# Patient Record
Sex: MALE | Race: WHITE | Employment: FULL TIME | URBAN - NONMETROPOLITAN AREA
[De-identification: names, ages, dates, MRNs, and addresses within clinical notes are randomized per-mention and may not be internally consistent; named-entity substitution may affect disease eponyms.]

---

## 2017-01-03 ENCOUNTER — TELEPHONE (OUTPATIENT)
Dept: FAMILY MEDICINE CLINIC | Age: 52
End: 2017-01-03

## 2017-01-03 DIAGNOSIS — Z00.00 GENERAL MEDICAL EXAM: Primary | ICD-10-CM

## 2017-01-03 DIAGNOSIS — Z12.5 SCREENING PSA (PROSTATE SPECIFIC ANTIGEN): ICD-10-CM

## 2017-01-21 DIAGNOSIS — Z00.00 GENERAL MEDICAL EXAM: ICD-10-CM

## 2017-01-21 DIAGNOSIS — Z12.5 SCREENING PSA (PROSTATE SPECIFIC ANTIGEN): ICD-10-CM

## 2017-01-21 LAB
ABSOLUTE EOS #: 0.2 K/UL (ref 0–0.4)
ABSOLUTE LYMPH #: 1.3 K/UL (ref 1–4.8)
ABSOLUTE MONO #: 0.4 K/UL (ref 0.1–1.2)
ANION GAP SERPL CALCULATED.3IONS-SCNC: 12 MMOL/L (ref 9–17)
BASOPHILS # BLD: 1 % (ref 0–2)
BASOPHILS ABSOLUTE: 0.1 K/UL (ref 0–0.2)
BUN BLDV-MCNC: 21 MG/DL (ref 6–20)
BUN/CREAT BLD: 20 (ref 9–20)
CALCIUM SERPL-MCNC: 10.2 MG/DL (ref 8.6–10.4)
CHLORIDE BLD-SCNC: 107 MMOL/L (ref 98–107)
CHOLESTEROL/HDL RATIO: 2.9
CHOLESTEROL: 132 MG/DL
CO2: 27 MMOL/L (ref 20–31)
CREAT SERPL-MCNC: 1.05 MG/DL (ref 0.7–1.2)
DIFFERENTIAL TYPE: ABNORMAL
EOSINOPHILS RELATIVE PERCENT: 4 % (ref 1–4)
GFR AFRICAN AMERICAN: >60 ML/MIN
GFR NON-AFRICAN AMERICAN: >60 ML/MIN
GFR SERPL CREATININE-BSD FRML MDRD: ABNORMAL ML/MIN/{1.73_M2}
GFR SERPL CREATININE-BSD FRML MDRD: ABNORMAL ML/MIN/{1.73_M2}
GLUCOSE BLD-MCNC: 103 MG/DL (ref 70–99)
HCT VFR BLD CALC: 41.5 % (ref 41–53)
HDLC SERPL-MCNC: 46 MG/DL
HEMOGLOBIN: 13.7 G/DL (ref 13.5–17.5)
LDL CHOLESTEROL: 75 MG/DL (ref 0–130)
LYMPHOCYTES # BLD: 27 % (ref 24–44)
MCH RBC QN AUTO: 30.3 PG (ref 26–34)
MCHC RBC AUTO-ENTMCNC: 33.1 G/DL (ref 31–37)
MCV RBC AUTO: 91.6 FL (ref 80–100)
MONOCYTES # BLD: 8 % (ref 1–7)
PDW BLD-RTO: 13 % (ref 11–14.5)
PLATELET # BLD: 279 K/UL (ref 140–450)
PLATELET ESTIMATE: ABNORMAL
PMV BLD AUTO: 7.9 FL (ref 6–12)
POTASSIUM SERPL-SCNC: 3.9 MMOL/L (ref 3.7–5.3)
PROSTATE SPECIFIC ANTIGEN: 0.93 UG/L
RBC # BLD: 4.53 M/UL (ref 4.5–5.9)
RBC # BLD: ABNORMAL 10*6/UL
SEG NEUTROPHILS: 60 % (ref 36–66)
SEGMENTED NEUTROPHILS ABSOLUTE COUNT: 2.9 K/UL (ref 1.8–7.7)
SODIUM BLD-SCNC: 146 MMOL/L (ref 135–144)
TRIGL SERPL-MCNC: 57 MG/DL
VLDLC SERPL CALC-MCNC: 11 MG/DL (ref 1–30)
WBC # BLD: 4.8 K/UL (ref 3.5–11)
WBC # BLD: ABNORMAL 10*3/UL

## 2017-01-21 PROCEDURE — G0103 PSA SCREENING: HCPCS | Performed by: FAMILY MEDICINE

## 2017-01-21 PROCEDURE — 80061 LIPID PANEL: CPT | Performed by: FAMILY MEDICINE

## 2017-01-21 PROCEDURE — 36415 COLL VENOUS BLD VENIPUNCTURE: CPT | Performed by: FAMILY MEDICINE

## 2017-01-21 PROCEDURE — 85025 COMPLETE CBC W/AUTO DIFF WBC: CPT | Performed by: FAMILY MEDICINE

## 2017-01-21 PROCEDURE — 80048 BASIC METABOLIC PNL TOTAL CA: CPT | Performed by: FAMILY MEDICINE

## 2017-02-08 ENCOUNTER — OFFICE VISIT (OUTPATIENT)
Dept: FAMILY MEDICINE CLINIC | Age: 52
End: 2017-02-08

## 2017-02-08 VITALS
DIASTOLIC BLOOD PRESSURE: 80 MMHG | SYSTOLIC BLOOD PRESSURE: 150 MMHG | HEIGHT: 70 IN | HEART RATE: 72 BPM | WEIGHT: 199 LBS | BODY MASS INDEX: 28.49 KG/M2

## 2017-02-08 DIAGNOSIS — Z00.00 ENCOUNTER FOR PREVENTATIVE ADULT HEALTH CARE EXAMINATION: Primary | ICD-10-CM

## 2017-02-08 DIAGNOSIS — Z12.11 SCREENING FOR COLON CANCER: ICD-10-CM

## 2017-02-08 DIAGNOSIS — F31.78 BIPOLAR DISORDER, IN FULL REMISSION, MOST RECENT EPISODE MIXED (HCC): ICD-10-CM

## 2017-02-08 DIAGNOSIS — G47.33 OSA ON CPAP: ICD-10-CM

## 2017-02-08 DIAGNOSIS — Z99.89 OSA ON CPAP: ICD-10-CM

## 2017-02-08 DIAGNOSIS — Z12.5 SCREENING PSA (PROSTATE SPECIFIC ANTIGEN): ICD-10-CM

## 2017-02-08 PROCEDURE — 99396 PREV VISIT EST AGE 40-64: CPT | Performed by: FAMILY MEDICINE

## 2017-02-08 ASSESSMENT — ENCOUNTER SYMPTOMS
RESPIRATORY NEGATIVE: 1
BACK PAIN: 1
GASTROINTESTINAL NEGATIVE: 1
ALLERGIC/IMMUNOLOGIC NEGATIVE: 1
EYES NEGATIVE: 1

## 2017-03-31 ENCOUNTER — OFFICE VISIT (OUTPATIENT)
Dept: FAMILY MEDICINE CLINIC | Age: 52
End: 2017-03-31
Payer: COMMERCIAL

## 2017-03-31 VITALS
SYSTOLIC BLOOD PRESSURE: 146 MMHG | HEART RATE: 80 BPM | WEIGHT: 199.08 LBS | BODY MASS INDEX: 28.5 KG/M2 | DIASTOLIC BLOOD PRESSURE: 80 MMHG | HEIGHT: 70 IN

## 2017-03-31 DIAGNOSIS — L72.12 TRICHILEMMAL CYST: Primary | ICD-10-CM

## 2017-03-31 PROCEDURE — 11422 EXC H-F-NK-SP B9+MARG 1.1-2: CPT | Performed by: FAMILY MEDICINE

## 2017-03-31 PROCEDURE — 99213 OFFICE O/P EST LOW 20 MIN: CPT | Performed by: FAMILY MEDICINE

## 2017-03-31 ASSESSMENT — ENCOUNTER SYMPTOMS
RESPIRATORY NEGATIVE: 1
GASTROINTESTINAL NEGATIVE: 1
EYES NEGATIVE: 1
ALLERGIC/IMMUNOLOGIC NEGATIVE: 1

## 2017-04-05 ENCOUNTER — OFFICE VISIT (OUTPATIENT)
Dept: FAMILY MEDICINE CLINIC | Age: 52
End: 2017-04-05

## 2017-04-05 DIAGNOSIS — Z48.02 VISIT FOR SUTURE REMOVAL: Primary | ICD-10-CM

## 2017-04-05 DIAGNOSIS — L84 CORN: Primary | ICD-10-CM

## 2017-04-05 PROCEDURE — 99024 POSTOP FOLLOW-UP VISIT: CPT | Performed by: FAMILY MEDICINE

## 2017-04-12 ENCOUNTER — OFFICE VISIT (OUTPATIENT)
Dept: PODIATRY | Age: 52
End: 2017-04-12
Payer: COMMERCIAL

## 2017-04-12 VITALS
SYSTOLIC BLOOD PRESSURE: 130 MMHG | BODY MASS INDEX: 28.52 KG/M2 | HEIGHT: 70 IN | DIASTOLIC BLOOD PRESSURE: 80 MMHG | WEIGHT: 199.2 LBS | HEART RATE: 80 BPM

## 2017-04-12 DIAGNOSIS — M79.672 LEFT FOOT PAIN: ICD-10-CM

## 2017-04-12 DIAGNOSIS — L84 CORNS AND CALLOSITIES: Primary | ICD-10-CM

## 2017-04-12 PROCEDURE — 11055 PARING/CUTG B9 HYPRKER LES 1: CPT | Performed by: PODIATRIST

## 2017-04-12 PROCEDURE — L3040 FT ARCH SUPRT PREMOLD LONGIT: HCPCS | Performed by: PODIATRIST

## 2017-05-03 ENCOUNTER — NURSE ONLY (OUTPATIENT)
Dept: SURGERY | Age: 52
End: 2017-05-03

## 2017-05-03 VITALS
WEIGHT: 208 LBS | DIASTOLIC BLOOD PRESSURE: 80 MMHG | HEART RATE: 80 BPM | HEIGHT: 70 IN | BODY MASS INDEX: 29.78 KG/M2 | SYSTOLIC BLOOD PRESSURE: 140 MMHG

## 2017-05-03 DIAGNOSIS — Z12.11 COLON CANCER SCREENING: Primary | ICD-10-CM

## 2017-05-04 RX ORDER — POLYETHYLENE GLYCOL 3350, SODIUM CHLORIDE, SODIUM BICARBONATE, POTASSIUM CHLORIDE 420; 11.2; 5.72; 1.48 G/4L; G/4L; G/4L; G/4L
POWDER, FOR SOLUTION ORAL
Qty: 4000 ML | Refills: 0 | Status: SHIPPED | OUTPATIENT
Start: 2017-05-04 | End: 2018-04-04 | Stop reason: ALTCHOICE

## 2017-07-14 ENCOUNTER — TELEPHONE (OUTPATIENT)
Dept: SURGERY | Age: 52
End: 2017-07-14

## 2018-01-24 ENCOUNTER — TELEPHONE (OUTPATIENT)
Dept: FAMILY MEDICINE CLINIC | Age: 53
End: 2018-01-24

## 2018-01-24 DIAGNOSIS — Z13.9 ENCOUNTER FOR SCREENING: Primary | ICD-10-CM

## 2018-01-24 NOTE — TELEPHONE ENCOUNTER
Pre service calling stating they scheduled pt for an appt 4-4 and they want labs put in for pt to do prior.

## 2018-03-24 ENCOUNTER — HOSPITAL ENCOUNTER (OUTPATIENT)
Dept: LAB | Age: 53
Setting detail: SPECIMEN
Discharge: HOME | End: 2018-03-24
Payer: COMMERCIAL

## 2018-03-24 DIAGNOSIS — Z13.9 ENCOUNTER FOR SCREENING: ICD-10-CM

## 2018-03-24 LAB
ABSOLUTE EOS #: 0.1 K/UL (ref 0–0.4)
ABSOLUTE IMMATURE GRANULOCYTE: NORMAL K/UL (ref 0–0.3)
ABSOLUTE LYMPH #: 1.7 K/UL (ref 1–4.8)
ABSOLUTE MONO #: 0.4 K/UL (ref 0.1–1.2)
ANION GAP SERPL CALCULATED.3IONS-SCNC: 10 MMOL/L (ref 9–17)
BASOPHILS # BLD: 1 % (ref 0–2)
BASOPHILS ABSOLUTE: 0.1 K/UL (ref 0–0.2)
BUN BLDV-MCNC: 18 MG/DL (ref 6–20)
BUN/CREAT BLD: 18 (ref 9–20)
CALCIUM SERPL-MCNC: 10.1 MG/DL (ref 8.6–10.4)
CHLORIDE BLD-SCNC: 106 MMOL/L (ref 98–107)
CHOLESTEROL, FASTING: 171 MG/DL
CHOLESTEROL/HDL RATIO: 3.1
CO2: 29 MMOL/L (ref 20–31)
CREAT SERPL-MCNC: 1 MG/DL (ref 0.7–1.2)
DIFFERENTIAL TYPE: NORMAL
EOSINOPHILS RELATIVE PERCENT: 3 % (ref 1–8)
GFR AFRICAN AMERICAN: >60 ML/MIN
GFR NON-AFRICAN AMERICAN: >60 ML/MIN
GFR SERPL CREATININE-BSD FRML MDRD: ABNORMAL ML/MIN/{1.73_M2}
GFR SERPL CREATININE-BSD FRML MDRD: ABNORMAL ML/MIN/{1.73_M2}
GLUCOSE FASTING: 106 MG/DL (ref 70–99)
HCT VFR BLD CALC: 42.7 % (ref 41–53)
HDLC SERPL-MCNC: 56 MG/DL
HEMOGLOBIN: 14.4 G/DL (ref 13.5–17.5)
IMMATURE GRANULOCYTES: NORMAL %
LDL CHOLESTEROL: 104 MG/DL (ref 0–130)
LYMPHOCYTES # BLD: 33 % (ref 15–43)
MCH RBC QN AUTO: 30.6 PG (ref 26–34)
MCHC RBC AUTO-ENTMCNC: 33.6 G/DL (ref 31–37)
MCV RBC AUTO: 91 FL (ref 80–100)
MONOCYTES # BLD: 7 % (ref 6–14)
NRBC AUTOMATED: NORMAL PER 100 WBC
PDW BLD-RTO: 13.1 % (ref 11–14.5)
PLATELET # BLD: 250 K/UL (ref 140–450)
PLATELET ESTIMATE: NORMAL
PMV BLD AUTO: 7.9 FL (ref 6–12)
POTASSIUM SERPL-SCNC: 4.1 MMOL/L (ref 3.7–5.3)
PROSTATE SPECIFIC ANTIGEN: 1.04 UG/L
RBC # BLD: 4.7 M/UL (ref 4.5–5.9)
RBC # BLD: NORMAL 10*6/UL
SEG NEUTROPHILS: 56 % (ref 44–74)
SEGMENTED NEUTROPHILS ABSOLUTE COUNT: 2.9 K/UL (ref 1.8–7.7)
SODIUM BLD-SCNC: 145 MMOL/L (ref 135–144)
TRIGLYCERIDE, FASTING: 56 MG/DL
VLDLC SERPL CALC-MCNC: NORMAL MG/DL (ref 1–30)
WBC # BLD: 5.2 K/UL (ref 3.5–11)
WBC # BLD: NORMAL 10*3/UL

## 2018-03-24 PROCEDURE — G0103 PSA SCREENING: HCPCS

## 2018-03-24 PROCEDURE — 80048 BASIC METABOLIC PNL TOTAL CA: CPT

## 2018-03-24 PROCEDURE — 85025 COMPLETE CBC W/AUTO DIFF WBC: CPT

## 2018-03-24 PROCEDURE — 36415 COLL VENOUS BLD VENIPUNCTURE: CPT

## 2018-03-24 PROCEDURE — 80061 LIPID PANEL: CPT

## 2018-04-04 ENCOUNTER — OFFICE VISIT (OUTPATIENT)
Dept: FAMILY MEDICINE CLINIC | Age: 53
End: 2018-04-04
Payer: COMMERCIAL

## 2018-04-04 VITALS
WEIGHT: 204.8 LBS | DIASTOLIC BLOOD PRESSURE: 94 MMHG | HEIGHT: 70 IN | RESPIRATION RATE: 12 BRPM | SYSTOLIC BLOOD PRESSURE: 138 MMHG | BODY MASS INDEX: 29.32 KG/M2 | HEART RATE: 60 BPM

## 2018-04-04 DIAGNOSIS — I10 ESSENTIAL HYPERTENSION: ICD-10-CM

## 2018-04-04 DIAGNOSIS — Z99.89 OSA ON CPAP: ICD-10-CM

## 2018-04-04 DIAGNOSIS — G47.33 OSA ON CPAP: ICD-10-CM

## 2018-04-04 DIAGNOSIS — Z00.00 ENCOUNTER FOR PREVENTATIVE ADULT HEALTH CARE EXAMINATION: Primary | ICD-10-CM

## 2018-04-04 DIAGNOSIS — Z12.5 SCREENING PSA (PROSTATE SPECIFIC ANTIGEN): ICD-10-CM

## 2018-04-04 DIAGNOSIS — F31.78 BIPOLAR DISORDER, IN FULL REMISSION, MOST RECENT EPISODE MIXED (HCC): ICD-10-CM

## 2018-04-04 PROCEDURE — 99396 PREV VISIT EST AGE 40-64: CPT | Performed by: FAMILY MEDICINE

## 2018-04-04 RX ORDER — LOSARTAN POTASSIUM 25 MG/1
25 TABLET ORAL DAILY
Qty: 30 TABLET | Refills: 11 | Status: SHIPPED | OUTPATIENT
Start: 2018-04-04 | End: 2018-10-17 | Stop reason: DRUGHIGH

## 2018-04-04 ASSESSMENT — PATIENT HEALTH QUESTIONNAIRE - PHQ9
2. FEELING DOWN, DEPRESSED OR HOPELESS: 0
SUM OF ALL RESPONSES TO PHQ QUESTIONS 1-9: 0
SUM OF ALL RESPONSES TO PHQ9 QUESTIONS 1 & 2: 0
1. LITTLE INTEREST OR PLEASURE IN DOING THINGS: 0

## 2018-04-04 ASSESSMENT — ENCOUNTER SYMPTOMS
EYES NEGATIVE: 1
RESPIRATORY NEGATIVE: 1
BACK PAIN: 1
GASTROINTESTINAL NEGATIVE: 1
ALLERGIC/IMMUNOLOGIC NEGATIVE: 1

## 2018-09-29 ENCOUNTER — HOSPITAL ENCOUNTER (OUTPATIENT)
Dept: LAB | Age: 53
Setting detail: SPECIMEN
Discharge: HOME | End: 2018-09-29
Payer: COMMERCIAL

## 2018-09-29 DIAGNOSIS — G47.33 OSA ON CPAP: ICD-10-CM

## 2018-09-29 DIAGNOSIS — Z99.89 OSA ON CPAP: ICD-10-CM

## 2018-09-29 LAB
ABSOLUTE EOS #: 0.2 K/UL (ref 0–0.4)
ABSOLUTE IMMATURE GRANULOCYTE: NORMAL K/UL (ref 0–0.3)
ABSOLUTE LYMPH #: 1.7 K/UL (ref 1–4.8)
ABSOLUTE MONO #: 0.4 K/UL (ref 0.1–1.2)
ANION GAP SERPL CALCULATED.3IONS-SCNC: 8 MMOL/L (ref 9–17)
BASOPHILS # BLD: 1 % (ref 0–2)
BASOPHILS ABSOLUTE: 0.1 K/UL (ref 0–0.2)
BUN BLDV-MCNC: 18 MG/DL (ref 6–20)
BUN/CREAT BLD: 17 (ref 9–20)
CALCIUM SERPL-MCNC: 10.6 MG/DL (ref 8.6–10.4)
CHLORIDE BLD-SCNC: 106 MMOL/L (ref 98–107)
CO2: 29 MMOL/L (ref 20–31)
CREAT SERPL-MCNC: 1.05 MG/DL (ref 0.7–1.2)
DIFFERENTIAL TYPE: NORMAL
EOSINOPHILS RELATIVE PERCENT: 3 % (ref 1–8)
GFR AFRICAN AMERICAN: >60 ML/MIN
GFR NON-AFRICAN AMERICAN: >60 ML/MIN
GFR SERPL CREATININE-BSD FRML MDRD: ABNORMAL ML/MIN/{1.73_M2}
GFR SERPL CREATININE-BSD FRML MDRD: ABNORMAL ML/MIN/{1.73_M2}
GLUCOSE BLD-MCNC: 106 MG/DL (ref 70–99)
HCT VFR BLD CALC: 43.2 % (ref 41–53)
HEMOGLOBIN: 14.9 G/DL (ref 13.5–17.5)
IMMATURE GRANULOCYTES: NORMAL %
LYMPHOCYTES # BLD: 33 % (ref 15–43)
MCH RBC QN AUTO: 31.7 PG (ref 26–34)
MCHC RBC AUTO-ENTMCNC: 34.5 G/DL (ref 31–37)
MCV RBC AUTO: 92.1 FL (ref 80–100)
MONOCYTES # BLD: 7 % (ref 6–14)
NRBC AUTOMATED: NORMAL PER 100 WBC
PDW BLD-RTO: 12.8 % (ref 11–14.5)
PLATELET # BLD: 293 K/UL (ref 140–450)
PLATELET ESTIMATE: NORMAL
PMV BLD AUTO: 8.1 FL (ref 6–12)
POTASSIUM SERPL-SCNC: 4.5 MMOL/L (ref 3.7–5.3)
RBC # BLD: 4.69 M/UL (ref 4.5–5.9)
RBC # BLD: NORMAL 10*6/UL
SEG NEUTROPHILS: 56 % (ref 44–74)
SEGMENTED NEUTROPHILS ABSOLUTE COUNT: 2.9 K/UL (ref 1.8–7.7)
SODIUM BLD-SCNC: 143 MMOL/L (ref 135–144)
WBC # BLD: 5.3 K/UL (ref 3.5–11)
WBC # BLD: NORMAL 10*3/UL

## 2018-09-29 PROCEDURE — 80048 BASIC METABOLIC PNL TOTAL CA: CPT

## 2018-09-29 PROCEDURE — 36415 COLL VENOUS BLD VENIPUNCTURE: CPT

## 2018-09-29 PROCEDURE — 85025 COMPLETE CBC W/AUTO DIFF WBC: CPT

## 2018-10-17 ENCOUNTER — OFFICE VISIT (OUTPATIENT)
Dept: FAMILY MEDICINE CLINIC | Age: 53
End: 2018-10-17
Payer: COMMERCIAL

## 2018-10-17 VITALS
HEIGHT: 70 IN | WEIGHT: 201 LBS | DIASTOLIC BLOOD PRESSURE: 84 MMHG | BODY MASS INDEX: 28.77 KG/M2 | HEART RATE: 68 BPM | SYSTOLIC BLOOD PRESSURE: 146 MMHG

## 2018-10-17 DIAGNOSIS — Z12.5 SCREENING PSA (PROSTATE SPECIFIC ANTIGEN): ICD-10-CM

## 2018-10-17 DIAGNOSIS — Z99.89 OSA ON CPAP: ICD-10-CM

## 2018-10-17 DIAGNOSIS — G47.33 OSA ON CPAP: ICD-10-CM

## 2018-10-17 DIAGNOSIS — I10 ESSENTIAL HYPERTENSION: Primary | ICD-10-CM

## 2018-10-17 DIAGNOSIS — F31.78 BIPOLAR DISORDER, IN FULL REMISSION, MOST RECENT EPISODE MIXED (HCC): ICD-10-CM

## 2018-10-17 PROCEDURE — 99214 OFFICE O/P EST MOD 30 MIN: CPT | Performed by: FAMILY MEDICINE

## 2018-10-17 PROCEDURE — 90686 IIV4 VACC NO PRSV 0.5 ML IM: CPT | Performed by: FAMILY MEDICINE

## 2018-10-17 PROCEDURE — 90471 IMMUNIZATION ADMIN: CPT | Performed by: FAMILY MEDICINE

## 2018-10-17 RX ORDER — LOSARTAN POTASSIUM 50 MG/1
50 TABLET ORAL DAILY
Qty: 30 TABLET | Refills: 5 | Status: SHIPPED | OUTPATIENT
Start: 2018-10-17 | End: 2018-11-08 | Stop reason: SDUPTHER

## 2018-10-17 ASSESSMENT — ENCOUNTER SYMPTOMS
BACK PAIN: 1
GASTROINTESTINAL NEGATIVE: 1
ALLERGIC/IMMUNOLOGIC NEGATIVE: 1
EYES NEGATIVE: 1
RESPIRATORY NEGATIVE: 1

## 2018-10-17 NOTE — PROGRESS NOTES
Subjective:      Patient ID: Noemi Rausch is a 47 y.o. male. Hypertension        Routine annual physical, updated preventative visit/health maintenance exam, follow up on chronic medical conditions, refills, and review of updated labs. I have reviewed the patient's medical history in detail and updated the computerized patient record. Following with Dr. Ender Galindo for bipolar disorder/meds. bp readings at home a little higher recently at 238'M systolic. Similar here today. Compliant with medications and cpap. Past Medical History:   Diagnosis Date    Bipolar disorder ([facility] [address]     diagnosed 12    DENISE on CPAP 2012    denise      Past Surgical History:   Procedure Laterality Date    COLONOSCOPY  06/16/2017    normal, Dr Venessa Ceron at [facility] yr follow up   1 [facility] N/A 03/31/2017    scalp, done by dr. John Knapp [facility] 2015    [address]  12/15/2000     Current Outpatient Prescriptions   Medication Sig Dispense Refill    Multiple Vitamins-Minerals (MULTIVITAMIN PO) Take 1 tablet by mouth daily      Omega-3 Fatty Acids (FISH OIL PO) Take 2 capsules by mouth daily      Coenzyme Q10 (CO Q 10 PO) Take 1 tablet by mouth daily      losartan (COZAAR) 25 MG tablet Take 1 tablet by mouth daily 30 tablet 11    LITHIUM CARBONATE ER PO Take 1,200 mg by mouth daily.  lamoTRIgine (LAMICTAL) 100 MG tablet Take 100 mg by mouth every morning.  lamoTRIgine (LAMICTAL) 200 MG tablet Take 200 mg by mouth nightly. No current facility-administered medications for this visit.       No Known Allergies  Social History   Substance Use Topics    Smoking status: Never Smoker    Smokeless tobacco: Never Used    Alcohol use Yes      Comment: minimal     Family History   Problem Relation Age of Onset    Uterine Cancer Paternal Grandmother     Lung Cancer Paternal Grandmother     High Cholesterol Paternal Grandmother     Brain Cancer Paternal Grandfather Review of Systems   Constitutional: Negative. HENT: Negative. Eyes: Negative. Respiratory: Negative. Cardiovascular: Negative. Gastrointestinal: Negative. Endocrine: Negative. Genitourinary: Negative. Musculoskeletal: Positive for back pain. Negative for arthralgias. Skin: Negative. Allergic/Immunologic: Negative. Neurological: Negative. Hematological: Negative. Psychiatric/Behavioral: Negative. Objective:   Physical Exam   Constitutional: He is oriented to person, place, and time. He appears well-developed and well-nourished. No distress. HENT:   Head: Normocephalic and atraumatic. Right Ear: External ear normal.   Left Ear: External ear normal.   Mouth/Throat: Oropharynx is clear and moist. No oropharyngeal exudate. Eyes: Conjunctivae and EOM are normal. No scleral icterus. Neck: Neck supple. No thyromegaly present. Cardiovascular: Normal rate, regular rhythm, normal heart sounds and intact distal pulses. No murmur heard. Pulmonary/Chest: Effort normal and breath sounds normal. No respiratory distress. He has no wheezes. Abdominal: Soft. Bowel sounds are normal. He exhibits no distension. There is no tenderness. There is no rebound. Musculoskeletal: Normal range of motion. He exhibits no edema or tenderness. Neurological: He is alert and oriented to person, place, and time. Skin: Skin is warm and dry. No rash noted. No erythema. Psychiatric: He has a normal mood and affect.  His behavior is normal. Judgment and thought content normal.     BP (!) 146/84 (Site: Right Upper Arm, Position: Sitting, Cuff Size: Large Adult)   Pulse 68   Ht 5' 10\" (1.778 m)   Wt 201 lb (91.2 kg)   BMI 28.84 kg/m²   Results for orders placed or performed during the hospital encounter of 72/93/64   Basic Metabolic Panel   Result Value Ref Range    Glucose 106 (H) 70 - 99 mg/dL    BUN 18 6 - 20 mg/dL    CREATININE 1.05 0.70 - 1.20 mg/dL    Bun/Cre Ratio 17 9

## 2018-11-08 RX ORDER — LOSARTAN POTASSIUM 50 MG/1
50 TABLET ORAL DAILY
Qty: 90 TABLET | Refills: 3 | Status: SHIPPED | OUTPATIENT
Start: 2018-11-08 | End: 2019-04-22 | Stop reason: SDUPTHER

## 2018-11-08 NOTE — TELEPHONE ENCOUNTER
From: Cammie Abbott  Sent: 11/7/2018 5:44 PM EST  Subject: Medication Renewal Request    Cammie Abbott would like a refill of the following medications:     losartan (COZAAR) 50 MG tablet Jay Antonio MD]   Patient Comment: [address] does not have this new dosage and will not fill my old prescription.  I will call in the morning to verify or please call me. [phone] Thank You    Preferred pharmacy: [facility] [phone]

## 2019-01-03 ENCOUNTER — OFFICE VISIT (OUTPATIENT)
Dept: INTERNAL MEDICINE | Age: 54
End: 2019-01-03
Payer: COMMERCIAL

## 2019-01-03 VITALS
OXYGEN SATURATION: 98 % | HEART RATE: 86 BPM | BODY MASS INDEX: 30.46 KG/M2 | WEIGHT: 212.8 LBS | RESPIRATION RATE: 18 BRPM | HEIGHT: 70 IN | SYSTOLIC BLOOD PRESSURE: 136 MMHG | TEMPERATURE: 98.4 F | DIASTOLIC BLOOD PRESSURE: 80 MMHG

## 2019-01-03 DIAGNOSIS — J01.00 ACUTE NON-RECURRENT MAXILLARY SINUSITIS: Primary | ICD-10-CM

## 2019-01-03 PROCEDURE — 99213 OFFICE O/P EST LOW 20 MIN: CPT | Performed by: NURSE PRACTITIONER

## 2019-01-03 RX ORDER — AMOXICILLIN 500 MG/1
500 CAPSULE ORAL 3 TIMES DAILY
Qty: 30 CAPSULE | Refills: 0 | Status: SHIPPED | OUTPATIENT
Start: 2019-01-03 | End: 2019-01-13

## 2019-01-03 RX ORDER — FLUTICASONE PROPIONATE 50 MCG
2 SPRAY, SUSPENSION (ML) NASAL DAILY
Qty: 1 BOTTLE | Refills: 0 | Status: SHIPPED | OUTPATIENT
Start: 2019-01-03 | End: 2019-04-17 | Stop reason: ALTCHOICE

## 2019-01-11 ASSESSMENT — ENCOUNTER SYMPTOMS
SHORTNESS OF BREATH: 0
FACIAL SWELLING: 0
DIARRHEA: 0
CONSTIPATION: 0
CHEST TIGHTNESS: 0
RHINORRHEA: 1
BLOOD IN STOOL: 0
SINUS PAIN: 1
COUGH: 0
SINUS PRESSURE: 1
TROUBLE SWALLOWING: 0
WHEEZING: 0
NAUSEA: 0
VOMITING: 0
ABDOMINAL PAIN: 0
EYE PAIN: 0
SORE THROAT: 1
COLOR CHANGE: 0

## 2019-04-17 ENCOUNTER — OFFICE VISIT (OUTPATIENT)
Dept: FAMILY MEDICINE CLINIC | Age: 54
End: 2019-04-17
Payer: COMMERCIAL

## 2019-04-17 VITALS
SYSTOLIC BLOOD PRESSURE: 132 MMHG | WEIGHT: 204 LBS | HEIGHT: 70 IN | HEART RATE: 60 BPM | BODY MASS INDEX: 29.2 KG/M2 | DIASTOLIC BLOOD PRESSURE: 78 MMHG

## 2019-04-17 DIAGNOSIS — G47.33 OSA ON CPAP: ICD-10-CM

## 2019-04-17 DIAGNOSIS — R73.01 IMPAIRED FASTING BLOOD SUGAR: ICD-10-CM

## 2019-04-17 DIAGNOSIS — F31.78 BIPOLAR DISORDER, IN FULL REMISSION, MOST RECENT EPISODE MIXED (HCC): ICD-10-CM

## 2019-04-17 DIAGNOSIS — Z12.5 SCREENING PSA (PROSTATE SPECIFIC ANTIGEN): ICD-10-CM

## 2019-04-17 DIAGNOSIS — Z99.89 OSA ON CPAP: ICD-10-CM

## 2019-04-17 DIAGNOSIS — I10 ESSENTIAL HYPERTENSION: Primary | ICD-10-CM

## 2019-04-17 PROCEDURE — 99214 OFFICE O/P EST MOD 30 MIN: CPT | Performed by: FAMILY MEDICINE

## 2019-04-17 ASSESSMENT — PATIENT HEALTH QUESTIONNAIRE - PHQ9
SUM OF ALL RESPONSES TO PHQ9 QUESTIONS 1 & 2: 0
2. FEELING DOWN, DEPRESSED OR HOPELESS: 0
SUM OF ALL RESPONSES TO PHQ QUESTIONS 1-9: 0
SUM OF ALL RESPONSES TO PHQ QUESTIONS 1-9: 0
1. LITTLE INTEREST OR PLEASURE IN DOING THINGS: 0

## 2019-04-17 NOTE — PROGRESS NOTES
of Systems   Constitutional: Negative. HENT: Negative. Eyes: Negative. Respiratory: Negative. Cardiovascular: Negative. Gastrointestinal: Negative. Endocrine: Negative. Genitourinary: Negative. Musculoskeletal: Positive for back pain. Negative for arthralgias. Skin: Negative. Allergic/Immunologic: Negative. Neurological: Negative. Hematological: Negative. Psychiatric/Behavioral: Negative. Objective:   Physical Exam   Constitutional: He is oriented to person, place, and time. He appears well-developed and well-nourished. No distress. HENT:   Head: Normocephalic and atraumatic. Right Ear: External ear normal.   Left Ear: External ear normal.   Mouth/Throat: Oropharynx is clear and moist. No oropharyngeal exudate. Eyes: Conjunctivae and EOM are normal. No scleral icterus. Neck: Neck supple. No thyromegaly present. Cardiovascular: Normal rate, regular rhythm, normal heart sounds and intact distal pulses. No murmur heard. Pulmonary/Chest: Effort normal and breath sounds normal. No respiratory distress. He has no wheezes. Abdominal: Soft. Bowel sounds are normal. He exhibits no distension. There is no tenderness. There is no rebound. Musculoskeletal: Normal range of motion. He exhibits no edema or tenderness. Neurological: He is alert and oriented to person, place, and time. Skin: Skin is warm and dry. No rash noted. No erythema. Psychiatric: He has a normal mood and affect. His behavior is normal. Judgment and thought content normal.     /78 (Site: Left Upper Arm, Position: Sitting, Cuff Size: Large Adult)   Pulse 60   Ht 5' 10\" (1.778 m)   Wt 204 lb (92.5 kg)   BMI 29.27 kg/m²       Assessment:       Encounter Diagnoses   Name Primary?     Essential hypertension Yes    Bipolar disorder, in full remission, most recent episode mixed ([address]     DENISE on CPAP     Impaired fasting blood sugar     Screening PSA (prostate specific antigen)

## 2019-04-22 RX ORDER — LOSARTAN POTASSIUM 50 MG/1
50 TABLET ORAL DAILY
Qty: 90 TABLET | Refills: 3 | Status: SHIPPED | OUTPATIENT
Start: 2019-04-22 | End: 2020-05-13 | Stop reason: SDUPTHER

## 2019-08-19 ENCOUNTER — OFFICE VISIT (OUTPATIENT)
Dept: FAMILY MEDICINE CLINIC | Age: 54
End: 2019-08-19
Payer: COMMERCIAL

## 2019-08-19 VITALS
DIASTOLIC BLOOD PRESSURE: 80 MMHG | HEART RATE: 89 BPM | HEIGHT: 70 IN | WEIGHT: 199 LBS | SYSTOLIC BLOOD PRESSURE: 130 MMHG | OXYGEN SATURATION: 98 % | BODY MASS INDEX: 28.49 KG/M2

## 2019-08-19 DIAGNOSIS — S60.10XA SUBUNGUAL HEMATOMA OF DIGIT OF HAND, INITIAL ENCOUNTER: Primary | ICD-10-CM

## 2019-08-19 PROCEDURE — 11740 EVACUATION SUBUNGUAL HMTMA: CPT | Performed by: FAMILY MEDICINE

## 2019-08-19 PROCEDURE — 99213 OFFICE O/P EST LOW 20 MIN: CPT | Performed by: FAMILY MEDICINE

## 2019-08-19 ASSESSMENT — PATIENT HEALTH QUESTIONNAIRE - PHQ9
SUM OF ALL RESPONSES TO PHQ QUESTIONS 1-9: 0
2. FEELING DOWN, DEPRESSED OR HOPELESS: 0
SUM OF ALL RESPONSES TO PHQ9 QUESTIONS 1 & 2: 0
1. LITTLE INTEREST OR PLEASURE IN DOING THINGS: 0
SUM OF ALL RESPONSES TO PHQ QUESTIONS 1-9: 0

## 2019-08-28 ENCOUNTER — HOSPITAL ENCOUNTER (OUTPATIENT)
Dept: GENERAL RADIOLOGY | Age: 54
Discharge: HOME | End: 2019-08-30
Payer: COMMERCIAL

## 2019-08-28 ENCOUNTER — OFFICE VISIT (OUTPATIENT)
Dept: FAMILY MEDICINE CLINIC | Age: 54
End: 2019-08-28
Payer: COMMERCIAL

## 2019-08-28 VITALS
BODY MASS INDEX: 28.35 KG/M2 | SYSTOLIC BLOOD PRESSURE: 136 MMHG | HEART RATE: 72 BPM | DIASTOLIC BLOOD PRESSURE: 72 MMHG | HEIGHT: 70 IN | WEIGHT: 198 LBS

## 2019-08-28 DIAGNOSIS — M25.512 ACUTE PAIN OF LEFT SHOULDER: Primary | ICD-10-CM

## 2019-08-28 DIAGNOSIS — M25.512 ACUTE PAIN OF LEFT SHOULDER: ICD-10-CM

## 2019-08-28 DIAGNOSIS — T07.XXXA ABRASIONS OF MULTIPLE SITES: ICD-10-CM

## 2019-08-28 DIAGNOSIS — H11.32 SCLERAL HEMORRHAGE OF LEFT EYE: ICD-10-CM

## 2019-08-28 PROCEDURE — 99214 OFFICE O/P EST MOD 30 MIN: CPT | Performed by: FAMILY MEDICINE

## 2019-08-28 PROCEDURE — 73030 X-RAY EXAM OF SHOULDER: CPT

## 2019-08-28 RX ORDER — OXYCODONE HYDROCHLORIDE AND ACETAMINOPHEN 5; 325 MG/1; MG/1
1 TABLET ORAL
COMMUNITY
Start: 2019-08-25 | End: 2019-08-28 | Stop reason: ALTCHOICE

## 2019-08-28 ASSESSMENT — PATIENT HEALTH QUESTIONNAIRE - PHQ9
SUM OF ALL RESPONSES TO PHQ QUESTIONS 1-9: 0
SUM OF ALL RESPONSES TO PHQ9 QUESTIONS 1 & 2: 0
1. LITTLE INTEREST OR PLEASURE IN DOING THINGS: 0
SUM OF ALL RESPONSES TO PHQ QUESTIONS 1-9: 0
2. FEELING DOWN, DEPRESSED OR HOPELESS: 0
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
## 2019-09-03 ENCOUNTER — TELEPHONE (OUTPATIENT)
Dept: FAMILY MEDICINE CLINIC | Age: 54
End: 2019-09-03

## 2019-09-03 NOTE — TELEPHONE ENCOUNTER
Pt calling stating he was given a work slip for 8-28 to 9-4 but note needs to states pt can return to work with no restrictions, please advise at above number.

## 2019-09-12 ENCOUNTER — PATIENT MESSAGE (OUTPATIENT)
Dept: FAMILY MEDICINE CLINIC | Age: 54
End: 2019-09-12

## 2019-11-04 ENCOUNTER — OFFICE VISIT (OUTPATIENT)
Dept: PRIMARY CARE CLINIC | Age: 54
End: 2019-11-04
Payer: COMMERCIAL

## 2019-11-04 VITALS
BODY MASS INDEX: 28.35 KG/M2 | DIASTOLIC BLOOD PRESSURE: 70 MMHG | HEART RATE: 70 BPM | OXYGEN SATURATION: 98 % | TEMPERATURE: 98.7 F | SYSTOLIC BLOOD PRESSURE: 122 MMHG | WEIGHT: 198 LBS | HEIGHT: 70 IN

## 2019-11-04 DIAGNOSIS — J06.9 UPPER RESPIRATORY TRACT INFECTION, UNSPECIFIED TYPE: Primary | ICD-10-CM

## 2019-11-04 PROCEDURE — 99213 OFFICE O/P EST LOW 20 MIN: CPT | Performed by: FAMILY MEDICINE

## 2019-11-04 RX ORDER — BENZONATATE 100 MG/1
100-200 CAPSULE ORAL EVERY 8 HOURS PRN
Qty: 30 CAPSULE | Refills: 0 | Status: SHIPPED | OUTPATIENT
Start: 2019-11-04 | End: 2020-06-29

## 2019-11-04 RX ORDER — PREDNISONE 20 MG/1
40 TABLET ORAL DAILY
Qty: 10 TABLET | Refills: 0 | Status: SHIPPED | OUTPATIENT
Start: 2019-11-04 | End: 2019-11-09

## 2019-11-04 ASSESSMENT — ENCOUNTER SYMPTOMS
SORE THROAT: 0
SINUS PRESSURE: 0
COUGH: 1
VOMITING: 0
DIARRHEA: 0
SHORTNESS OF BREATH: 1
WHEEZING: 0

## 2020-05-13 RX ORDER — LOSARTAN POTASSIUM 50 MG/1
50 TABLET ORAL DAILY
Qty: 90 TABLET | Refills: 0 | Status: SHIPPED | OUTPATIENT
Start: 2020-05-13 | End: 2020-06-29 | Stop reason: SDUPTHER

## 2020-05-26 ENCOUNTER — HOSPITAL ENCOUNTER (OUTPATIENT)
Dept: GENERAL RADIOLOGY | Age: 55
Discharge: HOME | End: 2020-05-28
Payer: COMMERCIAL

## 2020-05-26 ENCOUNTER — OFFICE VISIT (OUTPATIENT)
Dept: PRIMARY CARE CLINIC | Age: 55
End: 2020-05-26
Payer: COMMERCIAL

## 2020-05-26 VITALS
TEMPERATURE: 97.2 F | OXYGEN SATURATION: 97 % | DIASTOLIC BLOOD PRESSURE: 84 MMHG | BODY MASS INDEX: 28.23 KG/M2 | SYSTOLIC BLOOD PRESSURE: 130 MMHG | HEIGHT: 70 IN | HEART RATE: 61 BPM | WEIGHT: 197.2 LBS

## 2020-05-26 PROCEDURE — 99214 OFFICE O/P EST MOD 30 MIN: CPT | Performed by: FAMILY MEDICINE

## 2020-05-26 PROCEDURE — 73562 X-RAY EXAM OF KNEE 3: CPT

## 2020-05-26 RX ORDER — PREDNISONE 20 MG/1
TABLET ORAL
Qty: 15 TABLET | Refills: 0 | Status: SHIPPED | OUTPATIENT
Start: 2020-05-26 | End: 2020-06-29

## 2020-05-26 NOTE — PATIENT INSTRUCTIONS
you can put on your leg. Use a cane, crutches, or a walker as instructed. · Follow your doctor's instructions about activity during your healing process. If you can do mild exercise, slowly increase your activity. · Reach and stay at a healthy weight. Extra weight can strain the joints, especially the knees and hips, and make the pain worse. Losing even a few pounds may help. When should you call for help? [id number] anytime you think you may need emergency care. For example, call if:  · You have symptoms of a blood clot in your lung (called a pulmonary embolism). These may include:  ? Sudden chest pain. ? Trouble breathing. ? Coughing up blood. Call your doctor now or seek immediate medical care if:  · You have severe or increasing pain. · Your leg or foot turns cold or changes color. · You cannot stand or put weight on your knee. · Your knee looks twisted or bent out of shape. · You cannot move your knee. · You have signs of infection, such as:  ? Increased pain, swelling, warmth, or redness. ? Red streaks leading from the knee. ? Pus draining from a place on your knee. ? A fever. · You have signs of a blood clot in your leg (called a deep vein thrombosis), such as:  ? Pain in your calf, back of the knee, thigh, or groin. ? Redness and swelling in your leg or groin. Watch closely for changes in your health, and be sure to contact your doctor if:  · You have tingling, weakness, or numbness in your knee. · You have any new symptoms, such as swelling. · You have bruises from a knee injury that last longer than 2 weeks. · You do not get better as expected. Where can you learn more? Go to https://[employer]. org and sign in to your [employer] Yet account. Enter K195 in the [employer] box to learn more about \"Knee Pain or Injury: Care Instructions. \"     If you do not have an account, please click on the \"Sign Up Now\" link.   Current as of: June 26, 2019               Content Version: 12.5  © 0819-5748 Healthwise, Incorporated. Care instructions adapted under license by [facility] [place] ([facility]). If you have questions about a medical condition or this instruction, always ask your healthcare professional. [address] any warranty or liability for your use of this information.

## 2020-06-08 ENCOUNTER — PATIENT MESSAGE (OUTPATIENT)
Dept: PRIMARY CARE CLINIC | Age: 55
End: 2020-06-08

## 2020-06-08 NOTE — TELEPHONE ENCOUNTER
From: Sheryl Arreguin  To: Triston Espinoza DO  Sent: 6/8/2020 1:50 PM EDT  Subject: Visit Follow-Up Question    No don't know about the root cause of the knee. I sleep with a pillow under my knee. There is no leg weakness. I have pain, with weight on left leg and pivoting.       ----- Message -----   From:Yelena Mckenzie DO   Sent:6/8/2020 1:16 PM EDT   To:Patrice Lund   Subject:RE: Visit Follow-Up Question      Ellen Luciano,    Do you know what's causing the L leg symptoms? Does it feel weaker, or do you have pain or range of motion issues? We may want to investigate that to ensure you don't need further imaging, before sending you to PT.  [facility]      ----- Message -----   From:Patrice Lund   Sent:6/8/2020 9:14 AM EDT   To:Yelena Mckenzie DO   Subject:Visit Follow-Up Question    Left knee was better with the steroid but is becoming irritated again. Observing my walking. I am not lifting my left leg as high up and looks like that I am almost dragging my foot. As a result the right knee is experiencing discomfort while compensating for my left leg. Would Physical therapy be the next step?

## 2020-06-29 ENCOUNTER — OFFICE VISIT (OUTPATIENT)
Dept: FAMILY MEDICINE CLINIC | Age: 55
End: 2020-06-29
Payer: COMMERCIAL

## 2020-06-29 VITALS
TEMPERATURE: 98.4 F | HEIGHT: 70 IN | DIASTOLIC BLOOD PRESSURE: 72 MMHG | HEART RATE: 72 BPM | SYSTOLIC BLOOD PRESSURE: 130 MMHG | WEIGHT: 194 LBS | BODY MASS INDEX: 27.77 KG/M2

## 2020-06-29 PROCEDURE — 99213 OFFICE O/P EST LOW 20 MIN: CPT | Performed by: FAMILY MEDICINE

## 2020-06-29 RX ORDER — LOSARTAN POTASSIUM 50 MG/1
50 TABLET ORAL DAILY
Qty: 90 TABLET | Refills: 3 | Status: SHIPPED | OUTPATIENT
Start: 2020-06-29 | End: 2021-03-05 | Stop reason: SDUPTHER

## 2020-06-29 ASSESSMENT — PATIENT HEALTH QUESTIONNAIRE - PHQ9
SUM OF ALL RESPONSES TO PHQ QUESTIONS 1-9: 0
1. LITTLE INTEREST OR PLEASURE IN DOING THINGS: 0
SUM OF ALL RESPONSES TO PHQ9 QUESTIONS 1 & 2: 0
SUM OF ALL RESPONSES TO PHQ QUESTIONS 1-9: 0
2. FEELING DOWN, DEPRESSED OR HOPELESS: 0

## 2020-06-29 ASSESSMENT — ENCOUNTER SYMPTOMS
GASTROINTESTINAL NEGATIVE: 1
ALLERGIC/IMMUNOLOGIC NEGATIVE: 1
EYES NEGATIVE: 1
RESPIRATORY NEGATIVE: 1

## 2020-06-29 NOTE — PROGRESS NOTES
2020     Jj Lara (:  1965) is a 49 y.o. male, here for evaluation of the following medical concerns:    HPI   Acute visit for bilateral knee pain in the last 6 weeks. Left knee mostly, but some intermittent issues on the right, possibly due to more work , favoring the left knee. No recalled injury or initiating events. He has a newer pillow top for his bed that is more firm and he wonders if sleep position causing issues. Some sense of instability. He has a sense of incomplete extension at present, compared to the right knee. Working on concrete long term. Daily pain. Not giving out. Sense of stiffness and swelling (effusion). Uncomfortable going up and down steps. No locking or catching. Better placing pillow under it and stretching. No medication beyond iburprofen,  Helping somewhat. Past Medical History:   Diagnosis Date    Bipolar disorder ([facility] [address]     diagnosed 12    DENISE on CPAP      Past Surgical History:   Procedure Laterality Date    COLONOSCOPY  2017    normal, Dr Kenny Harper at [facility] yr follow up   1 [facility] N/A 2017    scalp, done by dr. Antonio Galvez [facility]     [address] [place] [address]  12/15/2000         Review of Systems   Constitutional: Negative. HENT: Negative. Eyes: Negative. Respiratory: Negative. Cardiovascular: Negative. Gastrointestinal: Negative. Endocrine: Negative. Genitourinary: Negative. Musculoskeletal: Positive for arthralgias (left > right knee). Skin: Negative. Allergic/Immunologic: Negative. Neurological: Negative. Hematological: Negative. Psychiatric/Behavioral: Negative. Prior to Visit Medications    Medication Sig Taking?  Authorizing Provider   losartan (COZAAR) 50 MG tablet Take 1 tablet by mouth daily Yes Fuentes Turcios MD   Multiple Vitamins-Minerals (MULTIVITAMIN PO) Take 1 tablet by mouth daily Yes Historical Provider, MD   Omega-3

## 2020-12-15 ENCOUNTER — HOSPITAL ENCOUNTER (OUTPATIENT)
Age: 55
Setting detail: SPECIMEN
Discharge: HOME | End: 2020-12-15
Payer: COMMERCIAL

## 2020-12-15 ENCOUNTER — OFFICE VISIT (OUTPATIENT)
Dept: PRIMARY CARE CLINIC | Age: 55
End: 2020-12-15
Payer: COMMERCIAL

## 2020-12-15 VITALS
HEIGHT: 70 IN | TEMPERATURE: 99.9 F | OXYGEN SATURATION: 96 % | WEIGHT: 208.6 LBS | HEART RATE: 62 BPM | DIASTOLIC BLOOD PRESSURE: 82 MMHG | SYSTOLIC BLOOD PRESSURE: 132 MMHG | BODY MASS INDEX: 29.86 KG/M2 | RESPIRATION RATE: 16 BRPM

## 2020-12-15 PROCEDURE — U0003 INFECTIOUS AGENT DETECTION BY NUCLEIC ACID (DNA OR RNA); SEVERE ACUTE RESPIRATORY SYNDROME CORONAVIRUS 2 (SARS-COV-2) (CORONAVIRUS DISEASE [COVID-19]), AMPLIFIED PROBE TECHNIQUE, MAKING USE OF HIGH THROUGHPUT TECHNOLOGIES AS DESCRIBED BY CMS-2020-01-R: HCPCS

## 2020-12-15 PROCEDURE — 99213 OFFICE O/P EST LOW 20 MIN: CPT | Performed by: FAMILY MEDICINE

## 2020-12-15 RX ORDER — BENZONATATE 100 MG/1
100-200 CAPSULE ORAL EVERY 8 HOURS PRN
Qty: 20 CAPSULE | Refills: 0 | Status: SHIPPED | OUTPATIENT
Start: 2020-12-15 | End: 2020-12-15 | Stop reason: SDUPTHER

## 2020-12-15 RX ORDER — BENZONATATE 100 MG/1
100-200 CAPSULE ORAL EVERY 8 HOURS PRN
Qty: 20 CAPSULE | Refills: 0 | Status: SHIPPED | OUTPATIENT
Start: 2020-12-15 | End: 2021-03-25 | Stop reason: ALTCHOICE

## 2020-12-15 ASSESSMENT — PATIENT HEALTH QUESTIONNAIRE - PHQ9
2. FEELING DOWN, DEPRESSED OR HOPELESS: 0
SUM OF ALL RESPONSES TO PHQ QUESTIONS 1-9: 0
SUM OF ALL RESPONSES TO PHQ9 QUESTIONS 1 & 2: 0
SUM OF ALL RESPONSES TO PHQ QUESTIONS 1-9: 0
1. LITTLE INTEREST OR PLEASURE IN DOING THINGS: 0
SUM OF ALL RESPONSES TO PHQ QUESTIONS 1-9: 0

## 2020-12-15 ASSESSMENT — ENCOUNTER SYMPTOMS
VOMITING: 0
NAUSEA: 0
SHORTNESS OF BREATH: 0
SORE THROAT: 0
DIARRHEA: 0
COUGH: 1
SINUS PRESSURE: 1
RHINORRHEA: 1
WHEEZING: 0

## 2020-12-15 NOTE — PROGRESS NOTES
[address]  [address], [address] Mariya Stuart  [phone]      Denver Bien is a 48 y.o. male who is c/o of Cough (Runny Nose, Fatigue, HA SX started 12.13.2020)      HPI:     Cough  This is a new problem. The current episode started in the past 7 days (2 days ago). The cough is non-productive. Associated symptoms include headaches (frontal, around his eyes/forehead), myalgias (mild), postnasal drip and rhinorrhea. Pertinent negatives include no chills, ear pain, fever (no fever at home, but is 99.9 here now), sore throat, shortness of breath or wheezing. Treatments tried: Advil (last dose 3.5 hours ago)     Wife is also ill - started with sx's 5 days ago, and was seen/tested 3 days ago. Still waiting on test results. Has not been isolating from her yet. Subjective:      Past Medical History:   Diagnosis Date    Bipolar disorder ([address]     diagnosed 12    DENISE on CPAP 2012      Past Surgical History:   Procedure Laterality Date    COLONOSCOPY  06/16/2017    normal, Dr Yanique Fang at [facility] yr follow up   1 [facility] N/A 03/31/2017    scalp, done by dr. Tunde Valera [facility] 2015    [address] [facility]  12/15/2000       Social History     Tobacco Use    Smoking status: Never Smoker    Smokeless tobacco: Never Used   Substance Use Topics    Alcohol use: Yes     Comment: minimal      Current Outpatient Medications   Medication Sig Dispense Refill    benzonatate (TESSALON) 100 MG capsule Take 1-2 capsules by mouth every 8 hours as needed for Cough 20 capsule 0    losartan (COZAAR) 50 MG tablet Take 1 tablet by mouth daily 90 tablet 3    Multiple Vitamins-Minerals (MULTIVITAMIN PO) Take 1 tablet by mouth daily      Omega-3 Fatty Acids (FISH OIL PO) Take 2 capsules by mouth daily      Coenzyme Q10 (CO Q 10 PO) Take 1 tablet by mouth daily      LITHIUM CARBONATE ER PO Take 1,200 mg by mouth daily.  lamoTRIgine (LAMICTAL) 100 MG tablet Take 100 mg by mouth every morning.  lamoTRIgine (LAMICTAL) 200 MG tablet Take 200 mg by mouth nightly. No current facility-administered medications for this visit. No Known Allergies    Review of Systems   Constitutional: Positive for fatigue. Negative for chills and fever (no fever at home, but is 99.9 here now). HENT: Positive for postnasal drip, rhinorrhea and sinus pressure (intermittent). Negative for congestion, ear pain and sore throat. Respiratory: Positive for cough. Negative for shortness of breath and wheezing. Gastrointestinal: Negative for diarrhea, nausea and vomiting. Musculoskeletal: Positive for myalgias (mild). Neurological: Positive for headaches (frontal, around his eyes/forehead). Objective:     Vitals:    12/15/20 1136   BP: 132/82   Site: Right Upper Arm   Position: Sitting   Cuff Size: Large Adult   Pulse: 62   Resp: 16   Temp: 99.9 °F (37.7 °C)   TempSrc: Temporal   SpO2: 96%   Weight: 208 lb 9.6 oz (94.6 kg)   Height: 5' 10\" (1.778 m)     Physical Exam  Vitals signs and nursing note reviewed. Constitutional:       General: He is not in acute distress. Appearance: He is well-developed. HENT:      Head: Normocephalic and atraumatic. Right Ear: Tympanic membrane, ear canal and external ear normal.      Left Ear: Tympanic membrane, ear canal and external ear normal.      Nose: Nose normal.      Right Sinus: No maxillary sinus tenderness or frontal sinus tenderness. Left Sinus: No maxillary sinus tenderness or frontal sinus tenderness. Mouth/Throat:      Pharynx: Posterior oropharyngeal erythema (Mild) present. No oropharyngeal exudate. Comments: Clear post-nasal drainage noted. Eyes:      Conjunctiva/sclera: Conjunctivae normal.      Pupils: Pupils are equal, round, and reactive to light. Neck:      Musculoskeletal: Neck supple.    Cardiovascular: Rate and Rhythm: Normal rate and regular rhythm. Heart sounds: Normal heart sounds. Pulmonary:      Effort: Pulmonary effort is normal. No respiratory distress. Breath sounds: Normal breath sounds. No wheezing or rales. Abdominal:      General: Bowel sounds are normal. There is no distension. Palpations: Abdomen is soft. Tenderness: There is no abdominal tenderness. Lymphadenopathy:      Cervical: No cervical adenopathy. Neurological:      Mental Status: He is alert and oriented to person, place, and time. Assessment:       Diagnosis Orders   1. Cough  COVID-19 Ambulatory    benzonatate (TESSALON) 100 MG capsule    DISCONTINUED: benzonatate (TESSALON) 100 MG capsule   2. Nonintractable headache, unspecified chronicity pattern, unspecified headache type  COVID-19 Ambulatory   3. Person under investigation for COVID-19  COVID-19 Ambulatory       Plan:      Covid test was obtained today. Pt was instructed to quarantine until results are known. Supportive care discussed - Tylenol/Motrin, warm compresses, sinus rinses, cough suppressants, warm salt water gargles, and increased fluids/rest.      Return if symptoms worsen or fail to improve in 3-5 days. Orders Placed This Encounter   Procedures    COVID-19 Ambulatory     Standing Status:   Future     Number of Occurrences:   1     Standing Expiration Date:   12/15/2021     Scheduling Instructions:      Saline media preferred given current shortage of viral transport media but both acceptable     Order Specific Question:   Is this test for diagnosis or screening? Answer:   Diagnosis of ill patient     Order Specific Question:   Symptomatic for COVID-19 as defined by CDC? Answer:   Yes     Order Specific Question:   Date of Symptom Onset     Answer:   12/13/2020     Order Specific Question:   Hospitalized for COVID-19? Answer:   No     Order Specific Question:   Admitted to ICU for COVID-19? Answer:    No Order Specific Question:   Employed in healthcare setting? Answer:   No     Order Specific Question:   Resident in a congregate (group) care setting? Answer:   No     Order Specific Question:   Pregnant: Answer:   No     Order Specific Question:   Previously tested for COVID-19? Answer:   No     Orders Placed This Encounter   Medications    benzonatate (TESSALON) 100 MG capsule     Sig: Take 1-2 capsules by mouth every 8 hours as needed for Cough     Dispense:  20 capsule     Refill:  0       Patient given educational materials - see patient instructions. Discussed use, benefit, and side effects of prescribed medications. All patient questions answered. Pt voiced understanding.        Electronically signed by Nusrat Oakley DO on 12/28/2020 at 12:21 AM

## 2020-12-18 LAB — SARS-COV-2, NAA: DETECTED

## 2021-03-04 ENCOUNTER — PATIENT MESSAGE (OUTPATIENT)
Dept: FAMILY MEDICINE CLINIC | Age: 56
End: 2021-03-04

## 2021-03-05 RX ORDER — LOSARTAN POTASSIUM 50 MG/1
50 TABLET ORAL DAILY
Qty: 90 TABLET | Refills: 1 | Status: SHIPPED | OUTPATIENT
Start: 2021-03-05 | End: 2021-09-14

## 2021-03-05 NOTE — TELEPHONE ENCOUNTER
Manny Jara called requesting a refill of the below medication which has been pended for you:     Requested Prescriptions     Pending Prescriptions Disp Refills    losartan (COZAAR) 50 MG tablet 90 tablet 1     Sig: Take 1 tablet by mouth daily       Last Appointment Date: 6/29/2020  Next Appointment Date: Visit date not found    No Known Allergies

## 2021-03-05 NOTE — TELEPHONE ENCOUNTER
From: Liliana Corrales  To: Carissa Talavera MD  Sent: 3/4/2021 5:32 PM EST  Subject: Prescription Question    [facility] is requesting that I fill a 90 day prescription for my Losartan Potassium 50mg tablets. They have directed me to use [address] in [facility]. I have just refilled my 30 day prescription yesterday. Could you please send a 90 day prescription to [facility] and I can fill it next month? Also can you find out if they carry Losartan Potassium? I have went from [facility] to [facility].  ([address] transferred me because they no longer carried it)

## 2021-03-25 ENCOUNTER — OFFICE VISIT (OUTPATIENT)
Dept: FAMILY MEDICINE CLINIC | Age: 56
End: 2021-03-25
Payer: COMMERCIAL

## 2021-03-25 VITALS
BODY MASS INDEX: 29.78 KG/M2 | WEIGHT: 208 LBS | DIASTOLIC BLOOD PRESSURE: 86 MMHG | SYSTOLIC BLOOD PRESSURE: 130 MMHG | HEIGHT: 70 IN | HEART RATE: 72 BPM

## 2021-03-25 DIAGNOSIS — I10 ESSENTIAL HYPERTENSION: Primary | ICD-10-CM

## 2021-03-25 DIAGNOSIS — N18.31 STAGE 3A CHRONIC KIDNEY DISEASE (HCC): ICD-10-CM

## 2021-03-25 DIAGNOSIS — F31.78 BIPOLAR DISORDER, IN FULL REMISSION, MOST RECENT EPISODE MIXED (HCC): ICD-10-CM

## 2021-03-25 DIAGNOSIS — Z99.89 OSA ON CPAP: ICD-10-CM

## 2021-03-25 DIAGNOSIS — R73.01 IMPAIRED FASTING BLOOD SUGAR: ICD-10-CM

## 2021-03-25 DIAGNOSIS — Z12.5 SCREENING PSA (PROSTATE SPECIFIC ANTIGEN): ICD-10-CM

## 2021-03-25 DIAGNOSIS — G89.29 CHRONIC PAIN OF LEFT KNEE: ICD-10-CM

## 2021-03-25 DIAGNOSIS — M25.562 CHRONIC PAIN OF LEFT KNEE: ICD-10-CM

## 2021-03-25 DIAGNOSIS — G47.33 OSA ON CPAP: ICD-10-CM

## 2021-03-25 PROCEDURE — 99214 OFFICE O/P EST MOD 30 MIN: CPT | Performed by: FAMILY MEDICINE

## 2021-03-25 ASSESSMENT — PATIENT HEALTH QUESTIONNAIRE - PHQ9
1. LITTLE INTEREST OR PLEASURE IN DOING THINGS: 0
SUM OF ALL RESPONSES TO PHQ QUESTIONS 1-9: 0
SUM OF ALL RESPONSES TO PHQ QUESTIONS 1-9: 0

## 2021-03-25 ASSESSMENT — ENCOUNTER SYMPTOMS
BACK PAIN: 1
ALLERGIC/IMMUNOLOGIC NEGATIVE: 1
GASTROINTESTINAL NEGATIVE: 1
RESPIRATORY NEGATIVE: 1
EYES NEGATIVE: 1

## 2021-03-25 NOTE — PROGRESS NOTES
Subjective:      Patient ID: eJan-Pierre Beatty is a 48 y.o. male. Hypertension          Routine annual follow up on chronic medical conditions, refills, and review of updated labs. I have reviewed the patient's medical history in detail and updated the computerized patient record. Following with [facility]/Dr. Mary Brunson for bipolar disorder/meds. bp readings at home 270 systolic on review. .  Compliant with medications and cpap. Mood stable. covid week before angie. Mild sinus symptoms and fatigue. No issues otherwise. No sequale. Left knee issues long term. Not able to fully straighten. Tends to drag and swing the leg more altering gait. He describes not liking to straighten the knee. Uses a pillow under the left knee at night for comfort. Labs recent at psychiatry with elevated renal function, on lithium , cozaar. He feels he was not drinking much while fasting for the labs. Past Medical History:   Diagnosis Date    Bipolar disorder ([facility] [address]     diagnosed 12    DENISE on CPAP 2012    denise      Past Surgical History:   Procedure Laterality Date    COLONOSCOPY  06/16/2017    normal, Dr Vic Espinoza at [facility] yr follow up   [facility] N/A 03/31/2017    scalp, done by dr. Abimbola Butt 2015    [facility]  12/15/2000     Current Outpatient Medications   Medication Sig Dispense Refill    losartan (COZAAR) 50 MG tablet Take 1 tablet by mouth daily 90 tablet 1    Multiple Vitamins-Minerals (MULTIVITAMIN PO) Take 1 tablet by mouth daily      Omega-3 Fatty Acids (FISH OIL PO) Take 2 capsules by mouth daily      Coenzyme Q10 (CO Q 10 PO) Take 1 tablet by mouth daily      LITHIUM CARBONATE ER PO Take 900 mg by mouth daily       lamoTRIgine (LAMICTAL) 100 MG tablet Take 100 mg by mouth every morning.  lamoTRIgine (LAMICTAL) 200 MG tablet Take 200 mg by mouth nightly.        No current facility-administered medications for this visit. No Known Allergies  Social History     Tobacco Use    Smoking status: Never Smoker    Smokeless tobacco: Never Used   Substance Use Topics    Alcohol use: Yes     Comment: minimal    Drug use: No     Family History   Problem Relation Age of Onset    Uterine Cancer Paternal Grandmother     Lung Cancer Paternal Grandmother     High Cholesterol Paternal Grandmother     Brain Cancer Paternal Grandfather            Review of Systems   Constitutional: Negative. HENT: Negative. Eyes: Negative. Respiratory: Negative. Cardiovascular: Negative. Gastrointestinal: Negative. Endocrine: Negative. Genitourinary: Negative. Musculoskeletal: Positive for arthralgias (left knee), back pain and gait problem. Skin: Negative. Allergic/Immunologic: Negative. Hematological: Negative. Psychiatric/Behavioral: Negative. Objective:   Physical Exam  Constitutional:       General: He is not in acute distress. Appearance: He is well-developed. HENT:      Head: Normocephalic and atraumatic. Right Ear: External ear normal.      Left Ear: External ear normal.      Mouth/Throat:      Pharynx: No oropharyngeal exudate. Eyes:      General: No scleral icterus. Conjunctiva/sclera: Conjunctivae normal.   Neck:      Musculoskeletal: Neck supple. Thyroid: No thyromegaly. Cardiovascular:      Rate and Rhythm: Normal rate and regular rhythm. Heart sounds: Normal heart sounds. No murmur. Pulmonary:      Effort: Pulmonary effort is normal. No respiratory distress. Breath sounds: Normal breath sounds. No wheezing. Abdominal:      General: Bowel sounds are normal. There is no distension. Palpations: Abdomen is soft. Tenderness: There is no abdominal tenderness. There is no rebound. Musculoskeletal: Normal range of motion. General: No tenderness. Skin:     General: Skin is warm and dry. Findings: No erythema or rash.    Neurological: Mental Status: He is alert and oriented to person, place, and time. Psychiatric:         Behavior: Behavior normal.         Thought Content: Thought content normal.         Judgment: Judgment normal.     left knee xray 5/26/20  Impression   1. Mild narrowing of the medial compartment. 2. No acute fracture or dislocation.               /86 (Site: Right Upper Arm, Position: Sitting, Cuff Size: Large Adult)   Pulse 72   Ht 5' 10\" (1.778 m)   Wt 208 lb (94.3 kg)   BMI 29.84 kg/m²   Sodium 144 134 - 146 mmol/L   Potassium, Bld 4.1 3.5 - 5 mmol/L   Chloride 109 98 - 109 mmol/L   CO2 29 22 - 32 mmol/L   Anion gap 6 5 - 15 mmol/L   BUN 20 5 - 23 mg/dL   Creatinine 1.38 (H)   Comment: METHOD TRACEABLE TO IDMS STANDARD 0.6 - 1.3 mg/dL   Glucose 107 (H) 65 - 99 mg/dL   Calcium 10.5 8.5 - 10.5 mg/dL   GFR MDRD Non Af Amer 54 (L) >59 ml/min/1.73sq. m   GFR MDRD Af Amer >60 >59 ml/min/1.73sq. Sodium 144 134 - 146 mmol/L   Potassium, Bld 4.1 3.5 - 5 mmol/L   Chloride 109 98 - 109 mmol/L   CO2 29 22 - 32 mmol/L   Anion gap 6 5 - 15 mmol/L   BUN 20 5 - 23 mg/dL   Creatinine 1.38 (H)   Comment: METHOD TRACEABLE TO IDMS STANDARD 0.6 - 1.3 mg/dL   Glucose 107 (H) 65 - 99 mg/dL   Calcium 10.5 8.5 - 10.5 mg/dL   GFR MDRD Non Af Amer 54 (L) >59 ml/min/1.73sq. m   GFR MDRD Af Amer >60 >59 ml/min/1.73sq. Assessment:     Encounter Diagnoses   Name Primary?  Essential hypertension Yes    Bipolar disorder, in full remission, most recent episode mixed ([address]     SHANE on CPAP     Impaired fasting blood sugar     Stage 3a chronic kidney disease     Chronic pain of left knee     Screening PSA (prostate specific antigen)        Plan:      Htn: remains elevated after starting cozaar 25mg and increasing to 50mg. Ok at present. Cont. Same and home checks. Bipolar disorder: Cont. Current meds through Dr. Dev Flannery. Stable. Some concerns with acute renal insufficiency. Shane.   Compliant, perceived benefit there, Cont. cpap nightly    Colonoscopy normal 6/16/17- 10 yr follow up. Mild ifbs at 92 and 107 on labs dated 2021. Stable. Dad and gma with diabetes. Discussed dietary changes and some wt. Loss. Ckd? Stage 3 GFR 53 and 54, creatinine 1.39 and 1.38 on labs 1/30/21 and 3/13/21 respectively. Concerns for lithium/cozaar use. Electrolytes normal.  He has a follow up panel next Monday with psych. He is going to hydrate better/not avoid water during his pre lab fasting period. If still elevated, may trial off cozaar first, then discuss lithium hold. Chronic right knee pain and altered gait. Xray unrevealing. Mechanical issue described. Knee does not want to extend fully, altering his gait. Right knee sore at times due to compensating for altered gait. Given chronicity and mechanical nature, referring to ortho for consult.

## 2021-03-31 DIAGNOSIS — M25.562 LEFT KNEE PAIN, UNSPECIFIED CHRONICITY: Primary | ICD-10-CM

## 2021-04-01 ENCOUNTER — HOSPITAL ENCOUNTER (OUTPATIENT)
Dept: GENERAL RADIOLOGY | Age: 56
Discharge: HOME | End: 2021-04-03
Payer: COMMERCIAL

## 2021-04-01 ENCOUNTER — OFFICE VISIT (OUTPATIENT)
Dept: ORTHOPEDIC SURGERY | Age: 56
End: 2021-04-01
Payer: COMMERCIAL

## 2021-04-01 VITALS
OXYGEN SATURATION: 99 % | DIASTOLIC BLOOD PRESSURE: 90 MMHG | WEIGHT: 208 LBS | HEIGHT: 70 IN | BODY MASS INDEX: 29.78 KG/M2 | HEART RATE: 89 BPM | SYSTOLIC BLOOD PRESSURE: 162 MMHG

## 2021-04-01 DIAGNOSIS — M25.462 EFFUSION OF LEFT KNEE JOINT: ICD-10-CM

## 2021-04-01 DIAGNOSIS — M25.562 LEFT KNEE PAIN, UNSPECIFIED CHRONICITY: ICD-10-CM

## 2021-04-01 DIAGNOSIS — M22.2X2 PATELLOFEMORAL SYNDROME OF LEFT KNEE: Primary | ICD-10-CM

## 2021-04-01 PROCEDURE — 99203 OFFICE O/P NEW LOW 30 MIN: CPT | Performed by: FAMILY MEDICINE

## 2021-04-01 PROCEDURE — 73562 X-RAY EXAM OF KNEE 3: CPT

## 2021-04-01 NOTE — PROGRESS NOTES
Sports Medicine Consultation     CHIEF COMPLAINT:  Knee Pain (Left)      HPI:  Lindsay Capone is a 48y.o. year old male who is a new patient being seen for regarding new problem left knee pain. The pain has been present for 1.5 year(s). The patient recalls a no specific injury. The patient has tried ibu without improvement. The pain is described as achy and sore. There is  pain on weightbearing. The knee does swell. There is is  painful popping and clicking. The knee does catch or lock. It has not given out. It is  stiff upon arising from sitting. It is  painful to go up and down stairs and sit for a prolonged period of time. he has a past medical history of Bipolar disorder ([address] and DENISE on CPAP. he has a past surgical history that includes Vasectomy (12/15/2000); Tonsillectomy; lymph node dissection (Right, 2015); cyst removal (N/A, 03/31/2017); and Colonoscopy (06/16/2017). family history includes Brain Cancer in his paternal grandfather; High Cholesterol in his paternal grandmother; June Orellana in his paternal grandmother; Uterine Cancer in his paternal grandmother.     Social History     Socioeconomic History    Marital status:      Spouse name: Shae Boor Number of children: 3    Years of education: 15    Highest education level: Not on file   Occupational History    Not on file   Social Needs    Financial resource strain: Not on file    Food insecurity     Worry: Not on file     Inability: Not on file   [employer] needs     Medical: Not on file     Non-medical: Not on file   Tobacco Use    Smoking status: Never Smoker    Smokeless tobacco: Never Used   Substance and Sexual Activity    Alcohol use: Yes     Comment: minimal    Drug use: No    Sexual activity: Not on file   Lifestyle    Physical activity     Days per week: Not on file     Minutes per session: Not on file    Stress: Not on file   Relationships    Social connections     Talks on phone: Not on file     Gets together: Not on file     Attends Episcopal service: Not on file     Active member of club or organization: Not on file     Attends meetings of clubs or organizations: Not on file     Relationship status: Not on file    Intimate partner violence     Fear of current or ex partner: Not on file     Emotionally abused: Not on file     Physically abused: Not on file     Forced sexual activity: Not on file   Other Topics Concern    Not on file   Social History Narrative    Not on file       Current Outpatient Medications   Medication Sig Dispense Refill    losartan (COZAAR) 50 MG tablet Take 1 tablet by mouth daily 90 tablet 1    Multiple Vitamins-Minerals (MULTIVITAMIN PO) Take 1 tablet by mouth daily      Omega-3 Fatty Acids (FISH OIL PO) Take 2 capsules by mouth daily      Coenzyme Q10 (CO Q 10 PO) Take 1 tablet by mouth daily      LITHIUM CARBONATE ER PO Take 900 mg by mouth daily       lamoTRIgine (LAMICTAL) 100 MG tablet Take 100 mg by mouth every morning.  lamoTRIgine (LAMICTAL) 200 MG tablet Take 200 mg by mouth nightly. No current facility-administered medications for this visit. Allergies:  hehas No Known Allergies. ROS:  CV:  Denies chest pain; palpitations; shortness of breath; swelling of feet, ankles; and loss of consciousness. CON: Denies fever and dizziness. ENT:  Denies hearing loss / ringing, ear infections hoarseness, and swallowing problems. RESP:  Denies chronic cough, spitting up blood, and asthma/wheezing. GI: Denies abdominal pain, change in bowel habits, nausea or vomiting, and blood in stools. :  Denies frequent urination, burning or painful urination, blood in the urine, and bladder incontinence. NEURO:  Denies headache, memory loss, sleep disturbance, and tremor or movement disorder.     PHYSICAL EXAM:   BP (!) 150/90 Comment: had alot of coffee before appointment  Pulse 89   Ht 5' 10\" (1.778 m)   Wt 208 lb (94.3 kg)   SpO2 99% BMI 29.84 kg/m²   GENERAL: Eduardo Almaraz is a 48 y.o. male who is alert and oriented and sitting comfortably in our office. SKIN:  Intact without rashes, lesions or ulcerations. NEURO: Sensation to the extremity is intact. VASC:  Capillary refill is less than 3 seconds. Distal pulses are palpable. There is no lymphadenopathy. Knee Exam  Musculoskeletal/Neurologic:  Inspection-Swelling: mild, Ecchymosis: no  Palpation-Tenderness:medial and pf joint line  Pain with patellar grind: yes  ROM- 5-115 block to flexion and extension 0-130 on unaffected side  Strength- WNL  Sensation-normal to light touch    Special Tests-  Varus Laxity: negative   Valgus Laxity:  negative   Anterior Drawer: negative   Posterior Drawer: negative  Lachman's: negative  Nayana's:negative    Gait: antalgic    PSYCH:  Good fund of knowledge and displays understanding of exam.    RADIOLOGY: Xr Knee Left (3 Views)    Result Date: 4/1/2021  1. Mild tricompartmental osteoarthrosis. Small joint effusion. 2. No acute fracture or dislocation. IMPRESSION:     1. Patellofemoral syndrome of left knee    2. Effusion of left knee joint          PLAN:   We discussed some of the etiologies and natural histories of     ICD-10-CM    1. Patellofemoral syndrome of left knee  M22.2X2 External Referral To Physical Therapy   2. Effusion of left knee joint  M25.462 External Referral To Physical Therapy   . We discussed the various treatment alternatives including anti-inflammatory medications, physical therapy, injections, further imaging studies and as a last resort surgery.   At this point little bit concerned about the patient's block to motion he explains more mechanical symptoms consistent with a meniscal tear and I do think that he has elements of patellofemoral symptomatology as well we will treat him conservatively with trying a course of physical therapy if he fails to improve we will do a MRI prior to follow-up patient voiced understanding and agreement with this plan    Return to clinic in No follow-ups on file. Lisa Jones Please be aware portions of this note were completed using voice recognition software and unforeseen errors may have occurred    Electronically signed by Geena Ceron DO, FAOASM  on 4/1/21 at 9:00 AM EDT        No orders of the defined types were placed in this encounter.

## 2021-07-01 ENCOUNTER — OFFICE VISIT (OUTPATIENT)
Dept: ORTHOPEDIC SURGERY | Age: 56
End: 2021-07-01
Payer: COMMERCIAL

## 2021-07-01 VITALS
SYSTOLIC BLOOD PRESSURE: 136 MMHG | WEIGHT: 208 LBS | BODY MASS INDEX: 29.78 KG/M2 | DIASTOLIC BLOOD PRESSURE: 84 MMHG | HEART RATE: 65 BPM | HEIGHT: 70 IN

## 2021-07-01 DIAGNOSIS — M23.52 RECURRENT INSTABILITY OF LEFT KNEE JOINT: Primary | ICD-10-CM

## 2021-07-01 PROCEDURE — 99213 OFFICE O/P EST LOW 20 MIN: CPT | Performed by: FAMILY MEDICINE

## 2021-07-01 NOTE — PROGRESS NOTES
Sports Medicine Consultation     CHIEF COMPLAINT:  Knee Pain (rech left knee)      HPI:  Claude Dow is a 48y.o. year old male who is a  established patient being seen for regarding follow up of a pre-existing problem left knee pain. The pain has been present for 2 year(s). The patient recalls a no new injury. The patient has tried 2m of therapy and ibu and rest without improvement. The pain is described as achy. There is  pain on weightbearing. The knee does swell. There is is  painful popping and clicking. The knee does catch or lock. It has given out. It is  stiff upon arising from sitting. It is  painful to go up and down stairs and sit for a prolonged period of time. he has a past medical history of Bipolar disorder ([address] and DENISE on CPAP. he has a past surgical history that includes Vasectomy (12/15/2000); Tonsillectomy; lymph node dissection (Right, 2015); cyst removal (N/A, 03/31/2017); and Colonoscopy (06/16/2017). family history includes Brain Cancer in his paternal grandfather; High Cholesterol in his paternal grandmother; Maurice Christians in his paternal grandmother; Uterine Cancer in his paternal grandmother.     Social History     Socioeconomic History    Marital status:      Spouse name: Zacehry Contreras Number of children: 3    Years of education: 15    Highest education level: Not on file   Occupational History    Not on file   Tobacco Use    Smoking status: Never Smoker    Smokeless tobacco: Never Used   Vaping Use    Vaping Use: Never used   Substance and Sexual Activity    Alcohol use: Yes     Comment: minimal    Drug use: No    Sexual activity: Not on file   Other Topics Concern    Not on file   Social History Narrative    Not on file     Social Determinants of Health     Financial Resource Strain:     Difficulty of Paying Living Expenses:    Food Insecurity:     Worried About Running Out of Food in the Last Year:     [address] in the Last Year:    Transportation Needs:     Lack of Transportation (Medical):  Lack of Transportation (Non-Medical):    Physical Activity:     Days of Exercise per Week:     Minutes of Exercise per Session:    Stress:     Feeling of Stress :    Social Connections:     Frequency of Communication with Friends and Family:     Frequency of Social Gatherings with Friends and Family:     Attends Sikh Services:     Active Member of Clubs or Organizations:     Attends Club or Organization Meetings:     Marital Status:    Intimate Partner Violence:     Fear of Current or Ex-Partner:     Emotionally Abused:     Physically Abused:     Sexually Abused:        Current Outpatient Medications   Medication Sig Dispense Refill    losartan (COZAAR) 50 MG tablet Take 1 tablet by mouth daily 90 tablet 1    Multiple Vitamins-Minerals (MULTIVITAMIN PO) Take 1 tablet by mouth daily      Omega-3 Fatty Acids (FISH OIL PO) Take 2 capsules by mouth daily      Coenzyme Q10 (CO Q 10 PO) Take 1 tablet by mouth daily      LITHIUM CARBONATE ER PO Take 900 mg by mouth daily       lamoTRIgine (LAMICTAL) 100 MG tablet Take 100 mg by mouth every morning.  lamoTRIgine (LAMICTAL) 200 MG tablet Take 200 mg by mouth nightly. No current facility-administered medications for this visit. Allergies:  hehas No Known Allergies. ROS:  CV:  Denies chest pain; palpitations; shortness of breath; swelling of feet, ankles; and loss of consciousness. CON: Denies fever and dizziness. ENT:  Denies hearing loss / ringing, ear infections hoarseness, and swallowing problems. RESP:  Denies chronic cough, spitting up blood, and asthma/wheezing. GI: Denies abdominal pain, change in bowel habits, nausea or vomiting, and blood in stools. :  Denies frequent urination, burning or painful urination, blood in the urine, and bladder incontinence.   NEURO:  Denies headache, memory loss, sleep disturbance, and tremor or movement disorder. PHYSICAL EXAM:   BP (!) 158/98   Pulse 65   Ht 5' 10\" (1.778 m)   Wt 208 lb (94.3 kg)   BMI 29.84 kg/m²   GENERAL: Liliana Zuñiga is a 48 y.o. male who is alert and oriented and sitting comfortably in our office. SKIN:  Intact without rashes, lesions or ulcerations. NEURO: Sensation to the extremity is intact. VASC:  Capillary refill is less than 3 seconds. Distal pulses are palpable. There is no lymphadenopathy. Knee Exam  Musculoskeletal/Neurologic:  Inspection-Swelling: mild, Ecchymosis: no  Palpation-Tenderness:medial joint  Pain with patellar grind: no  ROM- 3-120  Strength- WNL  Sensation-normal to light touch    Special Tests-  Varus Laxity: negative   Valgus Laxity:  negative   Anterior Drawer: negative   Posterior Drawer: negative  Lachman's: negative  Nayana's:negative  Thessaly: negative    Single Leg Squat: Negative  Deep Squat: Negative  Gait: antalgic    PSYCH:  Good fund of knowledge and displays understanding of exam.    RADIOLOGY: No results found. IMPRESSION:   No diagnosis found. PLAN:   We discussed some of the etiologies and natural histories of No diagnosis found. .  We discussed the various treatment alternatives including anti-inflammatory medications, physical therapy, injections, further imaging studies and as a last resort surgery. At this point patient has failed to improve with conservative measures of formal physical therapy and anti-inflammatories I do think an MRI is appropriate to assess for chronic meniscal tear causing mechanical symptoms we will get an MRI call him with results and see him back based on those results    Return to clinic in No follow-ups on file. Anam Osorio     Please be aware portions of this note were completed using voice recognition software and unforeseen errors may have occurred    Electronically signed by Viola Squires DO, FAOASM  on 7/1/21 at 10:55 AM EDT        No orders of the defined types were placed in this

## 2021-07-06 ENCOUNTER — HOSPITAL ENCOUNTER (OUTPATIENT)
Dept: MRI IMAGING | Age: 56
Discharge: HOME | End: 2021-07-08
Payer: COMMERCIAL

## 2021-07-06 DIAGNOSIS — M23.52 RECURRENT INSTABILITY OF LEFT KNEE JOINT: ICD-10-CM

## 2021-07-06 PROCEDURE — 73721 MRI JNT OF LWR EXTRE W/O DYE: CPT

## 2021-07-07 ENCOUNTER — TELEPHONE (OUTPATIENT)
Dept: ORTHOPEDIC SURGERY | Age: 56
End: 2021-07-07

## 2021-07-07 DIAGNOSIS — S83.232A COMPLEX TEAR OF MEDIAL MENISCUS OF LEFT KNEE AS CURRENT INJURY, INITIAL ENCOUNTER: Primary | ICD-10-CM

## 2021-07-07 NOTE — TELEPHONE ENCOUNTER
Pt called back. Discussed MRI results and referral to Dr Josette Galvez. Patient is amendable to canceling appointment with Dr Jhonny Cooper tomorrow to just schedule appt with Dr Josette Galvez.

## 2021-08-02 ENCOUNTER — OFFICE VISIT (OUTPATIENT)
Dept: ORTHOPEDIC SURGERY | Age: 56
End: 2021-08-02
Payer: COMMERCIAL

## 2021-08-02 VITALS
HEIGHT: 70 IN | SYSTOLIC BLOOD PRESSURE: 146 MMHG | OXYGEN SATURATION: 99 % | HEART RATE: 82 BPM | DIASTOLIC BLOOD PRESSURE: 84 MMHG | WEIGHT: 208 LBS | BODY MASS INDEX: 29.78 KG/M2

## 2021-08-02 DIAGNOSIS — M17.10 ARTHRITIS OF KNEE: Primary | ICD-10-CM

## 2021-08-02 DIAGNOSIS — M23.203 OTHER OLD TEAR OF MEDIAL MENISCUS OF RIGHT KNEE: ICD-10-CM

## 2021-08-02 PROCEDURE — 99203 OFFICE O/P NEW LOW 30 MIN: CPT | Performed by: ORTHOPAEDIC SURGERY

## 2021-08-02 NOTE — PROGRESS NOTES
Orthopedic Office Note  [facility]  [address]  [address]  [place] [address] [phone]      CHIEF COMPLAINT:    Chief Complaint   Patient presents with    Knee Pain     Lt knee meniscus tear       HISTORY OF PRESENT ILLNESS:      The patient is a 48 y.o. male  who presents today for evaluation of left knee pain. He reports he has had intermittent knee pain for the past 1-1/2 to 2 years. He denies a specific injury. He reports since making this appointment however his pain has significantly diminished. He describes medial knee pain that occurs mainly with bearing weight and twisting. He is not having pain with walking. Office note from Dr. Arnaldo Castano on July 1 of this year was reviewed. Recently he is going to physical therapy which she feels has helped with some of his symptoms. Past Medical History:    Past Medical History:   Diagnosis Date    Bipolar disorder ([facility] [address]     diagnosed 12    DENISE on CPAP 2012       Past Surgical History:    Past Surgical History:   Procedure Laterality Date    COLONOSCOPY  06/16/2017    normal, Dr Wallace Ma at [facility] yr follow up   [facility] N/A 03/31/2017    scalp, done by dr. Denny Ram [facility] 2015    [address] [facility]  12/15/2000       Medications Prior to Admission:   Current Outpatient Medications   Medication Sig Dispense Refill    losartan (COZAAR) 50 MG tablet Take 1 tablet by mouth daily 90 tablet 1    Multiple Vitamins-Minerals (MULTIVITAMIN PO) Take 1 tablet by mouth daily      Omega-3 Fatty Acids (FISH OIL PO) Take 2 capsules by mouth daily      Coenzyme Q10 (CO Q 10 PO) Take 1 tablet by mouth daily      LITHIUM CARBONATE ER PO Take 900 mg by mouth daily       lamoTRIgine (LAMICTAL) 100 MG tablet Take 100 mg by mouth every morning.  lamoTRIgine (LAMICTAL) 200 MG tablet Take 200 mg by mouth nightly.        No current facility-administered medications for this visit. Allergies:  Patient has no known allergies. Social History:   Social History     Tobacco Use   Smoking Status Never Smoker   Smokeless Tobacco Never Used     Social History     Substance and Sexual Activity   Alcohol Use Yes    Comment: minimal     Social History     Substance and Sexual Activity   Drug Use No       Family History:  Family History   Problem Relation Age of Onset    Uterine Cancer Paternal Grandmother     Lung Cancer Paternal Grandmother     High Cholesterol Paternal Grandmother     Brain Cancer Paternal Grandfather          REVIEW OF SYSTEMS:  Please see the ROS form attached to today's encounter. I have reviewed it with the patient during the visit. All other systems were reviewed and are negative. PHYSICAL EXAM:  Examination today of his left knee reveals a mild joint effusion. He has no medial joint line tenderness today. He has no ligamentous instability. He has full knee range of motion. His gait is nonantalgic. His balance is intact. Radiology:  I reviewed his x-rays of his left knee which show significant medial joint space narrowing and moderate sized and medial tibial plateau osteophyte. I reviewed his MRI report and images and agree with the findings of intermediate grade chondromalacia and degenerative medial meniscus tear. ASSESSMENT/PLAN:  1. Arthritis of knee    2. Other old tear of medial meniscus of right knee        Treatment options were discussed with the patient including medications, various injections, therapy, knee arthroscopy. He reports given since he is going to physical therapy symptoms have improved he reports this does not bother him enough that he feels any additional treatment is necessary. He will follow-up here on an as-needed basis. No orders of the defined types were placed in this encounter.        Archie Oscar MD

## 2021-08-30 ENCOUNTER — HOSPITAL ENCOUNTER (OUTPATIENT)
Age: 56
Setting detail: SPECIMEN
Discharge: HOME | End: 2021-08-30
Payer: COMMERCIAL

## 2021-08-30 ENCOUNTER — OFFICE VISIT (OUTPATIENT)
Dept: PRIMARY CARE CLINIC | Age: 56
End: 2021-08-30
Payer: COMMERCIAL

## 2021-08-30 VITALS
WEIGHT: 207 LBS | SYSTOLIC BLOOD PRESSURE: 140 MMHG | TEMPERATURE: 97.7 F | OXYGEN SATURATION: 97 % | DIASTOLIC BLOOD PRESSURE: 100 MMHG | HEART RATE: 81 BPM | BODY MASS INDEX: 29.63 KG/M2 | RESPIRATION RATE: 20 BRPM | HEIGHT: 70 IN

## 2021-08-30 DIAGNOSIS — J06.9 VIRAL URI: ICD-10-CM

## 2021-08-30 DIAGNOSIS — I10 HYPERTENSION, UNSPECIFIED TYPE: ICD-10-CM

## 2021-08-30 DIAGNOSIS — Z20.822 PERSON UNDER INVESTIGATION FOR COVID-19: ICD-10-CM

## 2021-08-30 DIAGNOSIS — J06.9 VIRAL URI: Primary | ICD-10-CM

## 2021-08-30 PROCEDURE — U0005 INFEC AGEN DETEC AMPLI PROBE: HCPCS

## 2021-08-30 PROCEDURE — U0003 INFECTIOUS AGENT DETECTION BY NUCLEIC ACID (DNA OR RNA); SEVERE ACUTE RESPIRATORY SYNDROME CORONAVIRUS 2 (SARS-COV-2) (CORONAVIRUS DISEASE [COVID-19]), AMPLIFIED PROBE TECHNIQUE, MAKING USE OF HIGH THROUGHPUT TECHNOLOGIES AS DESCRIBED BY CMS-2020-01-R: HCPCS

## 2021-08-30 PROCEDURE — 99213 OFFICE O/P EST LOW 20 MIN: CPT | Performed by: NURSE PRACTITIONER

## 2021-08-30 SDOH — ECONOMIC STABILITY: FOOD INSECURITY: WITHIN THE PAST 12 MONTHS, YOU WORRIED THAT YOUR FOOD WOULD RUN OUT BEFORE YOU GOT MONEY TO BUY MORE.: NEVER TRUE

## 2021-08-30 SDOH — ECONOMIC STABILITY: FOOD INSECURITY: WITHIN THE PAST 12 MONTHS, THE FOOD YOU BOUGHT JUST DIDN'T LAST AND YOU DIDN'T HAVE MONEY TO GET MORE.: NEVER TRUE

## 2021-08-30 ASSESSMENT — ENCOUNTER SYMPTOMS
WHEEZING: 0
COUGH: 1
VOMITING: 0
DIARRHEA: 0
NAUSEA: 0
SORE THROAT: 1
SHORTNESS OF BREATH: 0

## 2021-08-30 ASSESSMENT — SOCIAL DETERMINANTS OF HEALTH (SDOH): HOW HARD IS IT FOR YOU TO PAY FOR THE VERY BASICS LIKE FOOD, HOUSING, MEDICAL CARE, AND HEATING?: NOT HARD AT ALL

## 2021-08-30 NOTE — PATIENT INSTRUCTIONS
Will notify you of covid test result as soon as available. You should isolate at home in an area away from family. If you must be around family members, please wear a mask. Quarantine at home until result is available. This means do not go to work/school, attend family gatherings, or invite others to your home until you know your test results. A work/school note will be provided for you. Symptoms appear viral; no antibiotic warranted at this time. The following are measures you can try at home to help provide symptom relief:    --Increase your water intake to help thin secretions and maintain hydration. --Recommend coricidin HBP for cough and congestion. Use cool mist humidifier at bedtime to moisturize air. Use nasal saline rinse as needed for congestion. --Tylenol or ibuprofen for fever/body aches/headache. Warm/cold packs to forehead and back of neck can help with headache pain. Warm baths/showers can sooth body aches also. Monitor your BP at home and keep a log of readings, how you're feeling, what you're doing. Bring this to your appointment with PCP. Practice good hand hygiene and cover your cough and sneeze to prevent passing virus on. If symptoms worsen or fail to improve, please return to clinic. If you develop severe worsening of symptoms such as chest pain or difficulty breathing, please go to ER. Patient Education        Viral Respiratory Infection: Care Instructions  Your Care Instructions     Viruses are very small organisms. They grow in number after they enter your body. There are many types that cause different illnesses, such as colds and the mumps. The symptoms of a viral respiratory infection often start quickly. They include a fever, sore throat, and runny nose. You may also just not feel well. Or you may not want to eat much. Most viral respiratory infections are not serious. They usually get better with time and self-care.   Antibiotics are not used to treat a viral infection. That's because antibiotics will not help cure a viral illness. In some cases, antiviral medicine can help your body fight a serious viral infection. Follow-up care is a key part of your treatment and safety. Be sure to make and go to all appointments, and call your doctor if you are having problems. It's also a good idea to know your test results and keep a list of the medicines you take. How can you care for yourself at home? · Rest as much as possible until you feel better. · Be safe with medicines. Take your medicine exactly as prescribed. Call your doctor if you think you are having a problem with your medicine. You will get more details on the specific medicine your doctor prescribes. · Take an over-the-counter pain medicine, such as acetaminophen (Tylenol), ibuprofen (Advil, Motrin), or naproxen (Aleve), as needed for pain and fever. Read and follow all instructions on the label. Do not give aspirin to anyone younger than 20. It has been linked to Reye syndrome, a serious illness. · Drink plenty of fluids. Hot fluids, such as tea or soup, may help relieve congestion in your nose and throat. If you have kidney, heart, or liver disease and have to limit fluids, talk with your doctor before you increase the amount of fluids you drink. · Try to clear mucus from your lungs by breathing deeply and coughing. · Gargle with warm salt water once an hour. This can help reduce swelling and throat pain. Use 1 teaspoon of salt mixed in 1 cup of warm water. · Do not smoke or allow others to smoke around you. If you need help quitting, talk to your doctor about stop-smoking programs and medicines. These can increase your chances of quitting for good. To avoid spreading the virus  · Cough or sneeze into a tissue. Then throw the tissue away. · If you don't have a tissue, use your hand to cover your cough or sneeze. Then clean your hand. You can also cough into your sleeve.   · Wash your hands often. Use soap and warm water. Wash for 15 to 20 seconds each time. · If you don't have soap and water near you, you can clean your hands with alcohol wipes or gel. When should you call for help? Call your doctor now or seek immediate medical care if:    · You have a new or higher fever.     · Your fever lasts more than 48 hours.     · You have trouble breathing.     · You have a fever with a stiff neck or a severe headache.     · You are sensitive to light.     · You feel very sleepy or confused. Watch closely for changes in your health, and be sure to contact your doctor if:    · You do not get better as expected. Where can you learn more? Go to https://99 Fahrenheit[employer]Gaatueb.Stevie. org and sign in to your Rundown App account. Enter B947 in the Monkey Bizness box to learn more about \"Viral Respiratory Infection: Care Instructions. \"     If you do not have an account, please click on the \"Sign Up Now\" link. Current as of: October 26, 2020               Content Version: 12.9  © 2006-2021 Healthwise, Incorporated. Care instructions adapted under license by [facility] ([facility]). If you have questions about a medical condition or this instruction, always ask your healthcare professional. Anshu Jason any warranty or liability for your use of this information.

## 2021-08-30 NOTE — PROGRESS NOTES
[address] [place] [address]  Dept: [phone]  Dept Fax: [phone]  Loc: [phone]        CHIEF COMPLAINT       Chief Complaint   Patient presents with    Congestion     cough, runny nose, congestion, ST since friday        Nurses Notes reviewed and I agree except as noted in the HPI. HISTORY OF PRESENT ILLNESS   Freeman Quinones is a 48 y.o. male who presents to [facility] today (8/31/2021) for evaluation of:   Cough  This is a new problem. The current episode started in the past 7 days (8/27/21). The problem has been unchanged. The problem occurs every few minutes. The cough is non-productive. Associated symptoms include headaches (mild), myalgias (mild), nasal congestion, postnasal drip and a sore throat. Pertinent negatives include no chest pain, fever, shortness of breath or wheezing. He has tried nothing for the symptoms. Denies any ill contacts. Pt is fully vaccinated against covid. Prior hx of covid December 2020. REVIEW OF SYSTEMS     Review of Systems   Constitutional: Positive for fatigue. Negative for fever. HENT: Positive for congestion, postnasal drip and sore throat. Respiratory: Positive for cough. Negative for shortness of breath and wheezing. Cardiovascular: Negative for chest pain. Gastrointestinal: Negative for diarrhea, nausea and vomiting. Musculoskeletal: Positive for myalgias (mild). Neurological: Positive for headaches (mild). PAST MEDICAL HISTORY         Diagnosis Date    Bipolar disorder ([facility] [address]     diagnosed 12    DENISE on CPAP 2012       SURGICAL HISTORY     Patient  has a past surgical history that includes Vasectomy (12/15/2000); Tonsillectomy; lymph node dissection (Right, 2015); cyst removal (N/A, 03/31/2017); and Colonoscopy (06/16/2017).     CURRENT MEDICATIONS       Outpatient Medications Prior to Visit Medication Sig Dispense Refill    losartan (COZAAR) 50 MG tablet Take 1 tablet by mouth daily 90 tablet 1    Multiple Vitamins-Minerals (MULTIVITAMIN PO) Take 1 tablet by mouth daily      Omega-3 Fatty Acids (FISH OIL PO) Take 2 capsules by mouth daily      Coenzyme Q10 (CO Q 10 PO) Take 1 tablet by mouth daily      LITHIUM CARBONATE ER PO Take 600 mg by mouth daily       lamoTRIgine (LAMICTAL) 100 MG tablet Take 100 mg by mouth every morning.  lamoTRIgine (LAMICTAL) 200 MG tablet Take 200 mg by mouth nightly. No facility-administered medications prior to visit. ALLERGIES     Patient is has No Known Allergies. FAMILY HISTORY     Patient's family history includes Brain Cancer in his paternal grandfather; High Cholesterol in his paternal grandmother; Rody Spry in his paternal grandmother; Uterine Cancer in his paternal grandmother. SOCIAL HISTORY     Patient  reports that he has never smoked. He has never used smokeless tobacco. He reports current alcohol use. He reports that he does not use drugs. PHYSICAL EXAM     VITALS  BP: (!) 140/100, Temp: 97.7 °F (36.5 °C), Pulse: 81, Resp: 20, SpO2: 97 %  Physical Exam  Vitals reviewed. Constitutional:       General: He is not in acute distress. Appearance: He is not ill-appearing. HENT:      Right Ear: Tympanic membrane and ear canal normal.      Left Ear: Tympanic membrane and ear canal normal.      Nose: Congestion and rhinorrhea present. Rhinorrhea is clear. Right Turbinates: Not swollen. Left Turbinates: Not swollen. Mouth/Throat:      Lips: Pink. Mouth: Mucous membranes are moist.      Pharynx: Oropharynx is clear. Uvula midline. Posterior oropharyngeal erythema (mild) present. No oropharyngeal exudate. Tonsils: No tonsillar exudate. Cardiovascular:      Rate and Rhythm: Normal rate and regular rhythm. Heart sounds: Normal heart sounds, S1 normal and S2 normal. No murmur heard.      Pulmonary: Effort: Pulmonary effort is normal. No accessory muscle usage or respiratory distress. Breath sounds: Normal breath sounds. No wheezing or rhonchi. Musculoskeletal:      Cervical back: Normal range of motion and neck supple. No tenderness. Lymphadenopathy:      Cervical: No cervical adenopathy. Skin:     General: Skin is warm and dry. Capillary Refill: Capillary refill takes less than 2 seconds. Neurological:      General: No focal deficit present. Mental Status: He is alert. DIAGNOSTIC RESULTS   Labs:No results found for this visit on 08/30/21. IMAGING:        CLINICAL COURSE:     Vitals:    08/30/21 0907   BP: (!) 140/100   Pulse: 81   Resp: 20   Temp: 97.7 °F (36.5 °C)   SpO2: 97%   Weight: 207 lb (93.9 kg)   Height: 5' 10\" (1.778 m)           PROCEDURES:  None  FINAL IMPRESSION      1. Viral URI    2. Person under investigation for COVID-19    3. Hypertension, unspecified type         DISPOSITION/PLAN   Symptoms appear viral at this time. Discussed with pt that I have a low suspicion for covid due to previous hx and vaccination status; pt requests testing as he states there are multiple cases at his work. Covid testing collected and quarantine guidelines reviewed; will notify as soon as results are available. Discussed supportive measures for symptom relief and educated pt on s/s that warrant return to clinic. BP elevated today despite pt medication; pt denies any symptoms at this time. Recommend pt monitor at home and keep a log, bring this to appt with PCP in 1 month. Encouraged pt to return or go to ER should he have any chest pain, facial drooping, extremity weakness, difficulty speaking, severe headache, blurred vision, or any acute concerns. Work note provided. Patient Instructions     Will notify you of covid test result as soon as available. You should isolate at home in an area away from family. If you must be around family members, please wear a mask. Quarantine at home until result is available. This means do not go to work/school, attend family gatherings, or invite others to your home until you know your test results. A work/school note will be provided for you. Symptoms appear viral; no antibiotic warranted at this time. The following are measures you can try at home to help provide symptom relief:    --Increase your water intake to help thin secretions and maintain hydration. --Recommend coricidin HBP for cough and congestion. Use cool mist humidifier at bedtime to moisturize air. Use nasal saline rinse as needed for congestion. --Tylenol or ibuprofen for fever/body aches/headache. Warm/cold packs to forehead and back of neck can help with headache pain. Warm baths/showers can sooth body aches also. Monitor your BP at home and keep a log of readings, how you're feeling, what you're doing. Bring this to your appointment with PCP. Practice good hand hygiene and cover your cough and sneeze to prevent passing virus on. If symptoms worsen or fail to improve, please return to clinic. If you develop severe worsening of symptoms such as chest pain or difficulty breathing, please go to ER. Patient Education        Viral Respiratory Infection: Care Instructions  Your Care Instructions     Viruses are very small organisms. They grow in number after they enter your body. There are many types that cause different illnesses, such as colds and the mumps. The symptoms of a viral respiratory infection often start quickly. They include a fever, sore throat, and runny nose. You may also just not feel well. Or you may not want to eat much. Most viral respiratory infections are not serious. They usually get better with time and self-care. Antibiotics are not used to treat a viral infection. That's because antibiotics will not help cure a viral illness.  In some cases, antiviral medicine can help your body fight a serious viral infection. Follow-up care is a key part of your treatment and safety. Be sure to make and go to all appointments, and call your doctor if you are having problems. It's also a good idea to know your test results and keep a list of the medicines you take. How can you care for yourself at home? · Rest as much as possible until you feel better. · Be safe with medicines. Take your medicine exactly as prescribed. Call your doctor if you think you are having a problem with your medicine. You will get more details on the specific medicine your doctor prescribes. · Take an over-the-counter pain medicine, such as acetaminophen (Tylenol), ibuprofen (Advil, Motrin), or naproxen (Aleve), as needed for pain and fever. Read and follow all instructions on the label. Do not give aspirin to anyone younger than 20. It has been linked to Reye syndrome, a serious illness. · Drink plenty of fluids. Hot fluids, such as tea or soup, may help relieve congestion in your nose and throat. If you have kidney, heart, or liver disease and have to limit fluids, talk with your doctor before you increase the amount of fluids you drink. · Try to clear mucus from your lungs by breathing deeply and coughing. · Gargle with warm salt water once an hour. This can help reduce swelling and throat pain. Use 1 teaspoon of salt mixed in 1 cup of warm water. · Do not smoke or allow others to smoke around you. If you need help quitting, talk to your doctor about stop-smoking programs and medicines. These can increase your chances of quitting for good. To avoid spreading the virus  · Cough or sneeze into a tissue. Then throw the tissue away. · If you don't have a tissue, use your hand to cover your cough or sneeze. Then clean your hand. You can also cough into your sleeve. · Wash your hands often. Use soap and warm water. Wash for 15 to 20 seconds each time.   · If you don't have soap and water near you, you can clean your hands with alcohol wipes or gel.  When should you call for help? Call your doctor now or seek immediate medical care if:    · You have a new or higher fever.     · Your fever lasts more than 48 hours.     · You have trouble breathing.     · You have a fever with a stiff neck or a severe headache.     · You are sensitive to light.     · You feel very sleepy or confused. Watch closely for changes in your health, and be sure to contact your doctor if:    · You do not get better as expected. Where can you learn more? Go to https://Automated Insights.Abcodia. org and sign in to your Prosodic account. Enter H069 in the TraceWorks box to learn more about \"Viral Respiratory Infection: Care Instructions. \"     If you do not have an account, please click on the \"Sign Up Now\" link. Current as of: October 26, 2020               Content Version: 12.9  © 1719-2621 [employer]. Care instructions adapted under license by [facility] ([facility]). If you have questions about a medical condition or this instruction, always ask your healthcare professional. [place] [address] any warranty or liability for your use of this information. The use, risks, benefits, and potential side effects of prescribed and/or recommended medications were discussed. All questions were answered and the patient/caregiver voiced understanding. Orders Placed This Encounter   Procedures    COVID-19     Standing Status:   Future     Number of Occurrences:   1     Standing Expiration Date:   9/30/2021     Scheduling Instructions:      1) Due to current limited availability of the COVID-19 test, tests will be prioritized based on responses to questions above. Testing may be delayed due to volume.             2) Print and instruct patient to adhere to CDC home isolation program. (Link Above)              3) Set up or refer patient for a monitoring program.              4) Have patient sign up for and leverage Prosodic (if not previously done). Order Specific Question:   Is this test for diagnosis or screening? Answer:   Diagnosis of ill patient     Order Specific Question:   Symptomatic for COVID-19 as defined by CDC? Answer:   Yes     Order Specific Question:   Date of Symptom Onset     Answer:   8/27/2021     Order Specific Question:   Hospitalized for COVID-19? Answer:   No     Order Specific Question:   Admitted to ICU for COVID-19? Answer:   No     Order Specific Question:   Employed in healthcare setting? Answer:   No     Order Specific Question:   Resident in a congregate (group) care setting? Answer:   No     Order Specific Question:   Pregnant: Answer:   No     Order Specific Question:   Previously tested for COVID-19? Answer:   Yes     Outpatient Encounter Medications as of 8/30/2021   Medication Sig Dispense Refill    losartan (COZAAR) 50 MG tablet Take 1 tablet by mouth daily 90 tablet 1    Multiple Vitamins-Minerals (MULTIVITAMIN PO) Take 1 tablet by mouth daily      Omega-3 Fatty Acids (FISH OIL PO) Take 2 capsules by mouth daily      Coenzyme Q10 (CO Q 10 PO) Take 1 tablet by mouth daily      LITHIUM CARBONATE ER PO Take 600 mg by mouth daily       lamoTRIgine (LAMICTAL) 100 MG tablet Take 100 mg by mouth every morning.  lamoTRIgine (LAMICTAL) 200 MG tablet Take 200 mg by mouth nightly. No facility-administered encounter medications on file as of 8/30/2021. Return if symptoms worsen or fail to improve.                 Electronically signed by SALUD Merritt CNP on 8/31/2021 at 1:21 AM

## 2021-08-31 LAB
SARS-COV-2: NORMAL
SARS-COV-2: NOT DETECTED
SOURCE: NORMAL

## 2021-09-14 RX ORDER — LOSARTAN POTASSIUM 50 MG/1
50 TABLET ORAL DAILY
Qty: 90 TABLET | Refills: 1 | Status: SHIPPED | OUTPATIENT
Start: 2021-09-14 | End: 2022-01-24 | Stop reason: SDUPTHER

## 2021-09-18 ENCOUNTER — HOSPITAL ENCOUNTER (OUTPATIENT)
Dept: LAB | Age: 56
Discharge: HOME | End: 2021-09-18
Payer: COMMERCIAL

## 2021-09-18 DIAGNOSIS — Z12.5 SCREENING PSA (PROSTATE SPECIFIC ANTIGEN): ICD-10-CM

## 2021-09-18 DIAGNOSIS — I10 ESSENTIAL HYPERTENSION: ICD-10-CM

## 2021-09-18 LAB
ABSOLUTE EOS #: 0.19 K/UL (ref 0–0.44)
ABSOLUTE IMMATURE GRANULOCYTE: <0.03 K/UL (ref 0–0.3)
ABSOLUTE LYMPH #: 1.8 K/UL (ref 1.1–3.7)
ABSOLUTE MONO #: 0.45 K/UL (ref 0.1–1.2)
ANION GAP SERPL CALCULATED.3IONS-SCNC: 8 MMOL/L (ref 9–17)
BASOPHILS # BLD: 1 % (ref 0–2)
BASOPHILS ABSOLUTE: 0.05 K/UL (ref 0–0.2)
BUN BLDV-MCNC: 20 MG/DL (ref 6–20)
BUN/CREAT BLD: 16 (ref 9–20)
CALCIUM SERPL-MCNC: 10.3 MG/DL (ref 8.6–10.4)
CHLORIDE BLD-SCNC: 109 MMOL/L (ref 98–107)
CO2: 26 MMOL/L (ref 20–31)
CREAT SERPL-MCNC: 1.23 MG/DL (ref 0.7–1.2)
DIFFERENTIAL TYPE: ABNORMAL
EOSINOPHILS RELATIVE PERCENT: 3 % (ref 1–4)
GFR AFRICAN AMERICAN: >60 ML/MIN
GFR NON-AFRICAN AMERICAN: >60 ML/MIN
GFR SERPL CREATININE-BSD FRML MDRD: ABNORMAL ML/MIN/{1.73_M2}
GFR SERPL CREATININE-BSD FRML MDRD: ABNORMAL ML/MIN/{1.73_M2}
GLUCOSE BLD-MCNC: 88 MG/DL (ref 70–99)
HCT VFR BLD CALC: 40.6 % (ref 40.7–50.3)
HEMOGLOBIN: 13.3 G/DL (ref 13–17)
IMMATURE GRANULOCYTES: 0 %
LYMPHOCYTES # BLD: 32 % (ref 24–43)
MCH RBC QN AUTO: 30.5 PG (ref 25.2–33.5)
MCHC RBC AUTO-ENTMCNC: 32.8 G/DL (ref 25.2–33.5)
MCV RBC AUTO: 93.1 FL (ref 82.6–102.9)
MONOCYTES # BLD: 8 % (ref 3–12)
NRBC AUTOMATED: 0 PER 100 WBC
PDW BLD-RTO: 12.2 % (ref 11.8–14.4)
PLATELET # BLD: 249 K/UL (ref 138–453)
PLATELET ESTIMATE: ABNORMAL
PMV BLD AUTO: 10 FL (ref 8.1–13.5)
POTASSIUM SERPL-SCNC: 4 MMOL/L (ref 3.7–5.3)
PROSTATE SPECIFIC ANTIGEN: 1.62 UG/L
RBC # BLD: 4.36 M/UL (ref 4.21–5.77)
RBC # BLD: ABNORMAL 10*6/UL
SEG NEUTROPHILS: 56 % (ref 36–65)
SEGMENTED NEUTROPHILS ABSOLUTE COUNT: 3.08 K/UL (ref 1.5–8.1)
SODIUM BLD-SCNC: 143 MMOL/L (ref 135–144)
WBC # BLD: 5.6 K/UL (ref 3.5–11.3)
WBC # BLD: ABNORMAL 10*3/UL

## 2021-09-18 PROCEDURE — G0103 PSA SCREENING: HCPCS

## 2021-09-18 PROCEDURE — 36415 COLL VENOUS BLD VENIPUNCTURE: CPT

## 2021-09-18 PROCEDURE — 85025 COMPLETE CBC W/AUTO DIFF WBC: CPT

## 2021-09-18 PROCEDURE — 80048 BASIC METABOLIC PNL TOTAL CA: CPT

## 2021-09-30 ENCOUNTER — OFFICE VISIT (OUTPATIENT)
Dept: FAMILY MEDICINE CLINIC | Age: 56
End: 2021-09-30
Payer: COMMERCIAL

## 2021-09-30 VITALS
WEIGHT: 209.8 LBS | HEART RATE: 68 BPM | OXYGEN SATURATION: 98 % | DIASTOLIC BLOOD PRESSURE: 86 MMHG | TEMPERATURE: 97.7 F | RESPIRATION RATE: 12 BRPM | BODY MASS INDEX: 30.03 KG/M2 | HEIGHT: 70 IN | SYSTOLIC BLOOD PRESSURE: 132 MMHG

## 2021-09-30 DIAGNOSIS — Z99.89 OSA ON CPAP: ICD-10-CM

## 2021-09-30 DIAGNOSIS — G47.33 OSA ON CPAP: ICD-10-CM

## 2021-09-30 DIAGNOSIS — F31.78 BIPOLAR DISORDER, IN FULL REMISSION, MOST RECENT EPISODE MIXED (HCC): ICD-10-CM

## 2021-09-30 DIAGNOSIS — Z12.5 SCREENING PSA (PROSTATE SPECIFIC ANTIGEN): ICD-10-CM

## 2021-09-30 DIAGNOSIS — M25.562 CHRONIC PAIN OF LEFT KNEE: ICD-10-CM

## 2021-09-30 DIAGNOSIS — R73.01 IMPAIRED FASTING BLOOD SUGAR: ICD-10-CM

## 2021-09-30 DIAGNOSIS — G89.29 CHRONIC PAIN OF LEFT KNEE: ICD-10-CM

## 2021-09-30 DIAGNOSIS — N18.31 STAGE 3A CHRONIC KIDNEY DISEASE (HCC): ICD-10-CM

## 2021-09-30 DIAGNOSIS — I10 ESSENTIAL HYPERTENSION: Primary | ICD-10-CM

## 2021-09-30 PROCEDURE — 99214 OFFICE O/P EST MOD 30 MIN: CPT | Performed by: FAMILY MEDICINE

## 2021-09-30 ASSESSMENT — ENCOUNTER SYMPTOMS
BACK PAIN: 1
EYES NEGATIVE: 1
RESPIRATORY NEGATIVE: 1
GASTROINTESTINAL NEGATIVE: 1
ALLERGIC/IMMUNOLOGIC NEGATIVE: 1

## 2021-09-30 NOTE — PROGRESS NOTES
Subjective:      Patient ID: Geovany Kilgore is a 48 y.o. male. Hypertension          Routine annual follow up on chronic medical conditions, refills, and review of updated labs. I have reviewed the patient's medical history in detail and updated the computerized patient record. Following with Carla HOOK/Dr. Kay Pérez for bipolar disorder/meds. bp readings at home running normal range. .  Compliant with medications and cpap. Mood stable. covid week before angie. Mild sinus symptoms and fatigue. No issues otherwise. No sequale. Left knee issues long term. Not able to fully straighten. Tends to drag and swing the leg more altering gait. He describes not liking to straighten the knee. Uses a pillow under the left knee at night for comfort. Had PT which helped. Some right knee pain at present as well. Compliant with cpap. Past Medical History:   Diagnosis Date    Bipolar disorder ([facility] [address]     diagnosed 12    DENISE on CPAP 2012          Past Surgical History:   Procedure Laterality Date    COLONOSCOPY  06/16/2017    normal, Dr Irwin Dates at [facility] yr follow up   1 [facility] N/A 03/31/2017    scalp, done by dr. Tavia Pinto [facility] 2015    [address] [facility]  12/15/2000     Current Outpatient Medications   Medication Sig Dispense Refill    losartan (COZAAR) 50 MG tablet TAKE 1 TABLET BY MOUTH DAILY 90 tablet 1    Multiple Vitamins-Minerals (MULTIVITAMIN PO) Take 1 tablet by mouth daily      Omega-3 Fatty Acids (FISH OIL PO) Take 2 capsules by mouth daily      Coenzyme Q10 (CO Q 10 PO) Take 1 tablet by mouth daily      LITHIUM CARBONATE ER PO Take 600 mg by mouth daily       lamoTRIgine (LAMICTAL) 100 MG tablet Take 100 mg by mouth every morning.  lamoTRIgine (LAMICTAL) 200 MG tablet Take 200 mg by mouth nightly. No current facility-administered medications for this visit.      No Known Allergies  Social History     Tobacco Use    Smoking status: Never Smoker    Smokeless tobacco: Never Used   Vaping Use    Vaping Use: Never used   Substance Use Topics    Alcohol use: Yes     Comment: minimal    Drug use: No     Family History   Problem Relation Age of Onset    Uterine Cancer Paternal Grandmother     Lung Cancer Paternal Grandmother     High Cholesterol Paternal Grandmother     Brain Cancer Paternal Grandfather            Review of Systems   Constitutional: Negative. HENT: Negative. Eyes: Negative. Respiratory: Negative. Cardiovascular: Negative. Gastrointestinal: Negative. Endocrine: Negative. Genitourinary: Negative. Musculoskeletal: Positive for arthralgias (left knee), back pain and gait problem. Skin: Negative. Allergic/Immunologic: Negative. Hematological: Negative. Psychiatric/Behavioral: Negative. Objective:   Physical Exam  Constitutional:       General: He is not in acute distress. Appearance: He is well-developed. HENT:      Head: Normocephalic and atraumatic. Right Ear: External ear normal.      Left Ear: External ear normal.      Mouth/Throat:      Pharynx: No oropharyngeal exudate. Eyes:      General: No scleral icterus. Conjunctiva/sclera: Conjunctivae normal.   Neck:      Thyroid: No thyromegaly. Cardiovascular:      Rate and Rhythm: Normal rate and regular rhythm. Heart sounds: Normal heart sounds. No murmur heard. Pulmonary:      Effort: Pulmonary effort is normal. No respiratory distress. Breath sounds: Normal breath sounds. No wheezing. Abdominal:      General: Bowel sounds are normal. There is no distension. Palpations: Abdomen is soft. Tenderness: There is no abdominal tenderness. There is no rebound. Musculoskeletal:         General: No tenderness. Normal range of motion. Cervical back: Neck supple. Skin:     General: Skin is warm and dry. Findings: No erythema or rash.    Neurological:      Mental Status: He is alert and oriented to person, place, and time. Psychiatric:         Behavior: Behavior normal.         Thought Content: Thought content normal.         Judgment: Judgment normal.     left knee xray 5/26/20  Impression   1. Mild narrowing of the medial compartment.    2. No acute fracture or dislocation.               /86 (Site: Left Upper Arm, Position: Sitting, Cuff Size: Medium Adult)   Pulse 68   Temp 97.7 °F (36.5 °C) (Temporal)   Resp 12   Ht 5' 10\" (1.778 m)   Wt 209 lb 12.8 oz (95.2 kg)   SpO2 98%   BMI 30.10 kg/m²   Hospital Outpatient Visit on 09/18/2021   Component Date Value Ref Range Status    PSA 09/18/2021 1.62  <4.1 ug/L Final    Glucose 09/18/2021 88  70 - 99 mg/dL Final    BUN 09/18/2021 20  6 - 20 mg/dL Final    CREATININE 09/18/2021 1.23* 0.70 - 1.20 mg/dL Final    Bun/Cre Ratio 09/18/2021 16  9 - 20 Final    Calcium 09/18/2021 10.3  8.6 - 10.4 mg/dL Final    Sodium 09/18/2021 143  135 - 144 mmol/L Final    Potassium 09/18/2021 4.0  3.7 - 5.3 mmol/L Final    Chloride 09/18/2021 109* 98 - 107 mmol/L Final    CO2 09/18/2021 26  20 - 31 mmol/L Final    Anion Gap 09/18/2021 8* 9 - 17 mmol/L Final    GFR Non- 09/18/2021 >60  >60 mL/min Final    GFR  09/18/2021 >60  >60 mL/min Final    GFR Comment 09/18/2021        Final    GFR Staging 09/18/2021 NOT REPORTED   Final    WBC 09/18/2021 5.6  3.5 - 11.3 k/uL Final    RBC 09/18/2021 4.36  4.21 - 5.77 m/uL Final    Hemoglobin 09/18/2021 13.3  13.0 - 17.0 g/dL Final    Hematocrit 09/18/2021 40.6* 40.7 - 50.3 % Final    MCV 09/18/2021 93.1  82.6 - 102.9 fL Final    MCH 09/18/2021 30.5  25.2 - 33.5 pg Final    MCHC 09/18/2021 32.8  25.2 - 33.5 g/dL Final    RDW 09/18/2021 12.2  11.8 - 14.4 % Final    Platelets 09/86/1843 249  138 - 453 k/uL Final    MPV 09/18/2021 10.0  8.1 - 13.5 fL Final    NRBC Automated 09/18/2021 0.0  0.0 per 100 WBC Final    Differential Type 09/18/2021 NOT REPORTED   Final    Seg Neutrophils 09/18/2021 56  36 - 65 % Final    Lymphocytes 09/18/2021 32  24 - 43 % Final    Monocytes 09/18/2021 8  3 - 12 % Final    Eosinophils % 09/18/2021 3  1 - 4 % Final    Basophils 09/18/2021 1  0 - 2 % Final    Immature Granulocytes 09/18/2021 0  0 % Final    Segs Absolute 09/18/2021 3.08  1.50 - 8.10 k/uL Final    Absolute Lymph # 09/18/2021 1.80  1.10 - 3.70 k/uL Final    Absolute Mono # 09/18/2021 0.45  0.10 - 1.20 k/uL Final    Absolute Eos # 09/18/2021 0.19  0.00 - 0.44 k/uL Final    Basophils Absolute 09/18/2021 0.05  0.00 - 0.20 k/uL Final    Absolute Immature Granulocyte 09/18/2021 <0.03  0.00 - 0.30 k/uL Final    WBC Morphology 09/18/2021 NOT REPORTED   Final    RBC Morphology 09/18/2021 NOT REPORTED   Final    Platelet Estimate 76/60/3657 NOT REPORTED   Final       Assessment:     Encounter Diagnoses   Name Primary?  Essential hypertension Yes    Bipolar disorder, in full remission, most recent episode mixed ([facility] [address]     SHANE on CPAP     Impaired fasting blood sugar     Stage 3a chronic kidney disease (HCC)     Chronic pain of left knee     Screening PSA (prostate specific antigen)          Plan:      Htn: remains elevated after starting cozaar 25mg and increasing to 50mg. Ok at present. Cont. Same and home checks. Bipolar disorder: Cont. Current meds through Dr. Vikas Lopez. Stable. Reducing dose of lithium through psychiatry. Shane. Compliant, perceived benefit there, Cont. cpap nightly    Colonoscopy normal 6/16/17- 10 yr follow up. Mild ifbs at 92 and 107 on labs dated 2021. Stable. Dad and gma with diabetes. Discussed dietary changes and some wt. Loss. 88 this time around. Ckd? Stage 3 GFR 53 and 54, creatinine 1.39 and 1.38 on labs 1/30/21 and 3/13/21 respectively. Concerns for lithium/cozaar use. Electrolytes normal.  . He is going to hydrate better/not avoid water during his pre lab fasting period.   If still elevated, may trial off cozaar first, then discuss lithium hold. Currently with GFR >60    Chronic left knee pain and altered gait. Xray unrevealing. Mechanical issue described. Knee does not want to extend fully, altering his gait. Right knee sore at times due to compensating for altered gait. Given chronicity and mechanical nature, referring to ortho for consult. Saw Dr. Tea Bustamante, referred to PT . He is doing better with PT. Improved at present. psa normal.  Cont.  Annual screening

## 2022-01-24 RX ORDER — LOSARTAN POTASSIUM 50 MG/1
50 TABLET ORAL DAILY
Qty: 90 TABLET | Refills: 1 | Status: SHIPPED | OUTPATIENT
Start: 2022-01-24 | End: 2022-03-14

## 2022-01-24 RX ORDER — LOSARTAN POTASSIUM 50 MG/1
50 TABLET ORAL DAILY
Qty: 90 TABLET | Refills: 1 | Status: SHIPPED | OUTPATIENT
Start: 2022-01-24 | End: 2022-01-24 | Stop reason: SDUPTHER

## 2022-02-11 ENCOUNTER — PATIENT MESSAGE (OUTPATIENT)
Dept: FAMILY MEDICINE CLINIC | Age: 57
End: 2022-02-11

## 2022-02-11 NOTE — TELEPHONE ENCOUNTER
I spoke to patient and he states he has been having palpations on and off for 2 weeks, some fatigue, and intermittent chest pressure. I advised patient to go to ER for evaluation. He agreed and will go to ER for evaluation.

## 2022-02-11 NOTE — TELEPHONE ENCOUNTER
Pt calling in for appt, questioned pt if he was in [facility], pt did not think so at this time, informed pt to come to UC, and that he might be sent to the ER to be evaluated.

## 2022-03-14 RX ORDER — LOSARTAN POTASSIUM 50 MG/1
50 TABLET ORAL DAILY
Qty: 90 TABLET | Refills: 1 | Status: SHIPPED | OUTPATIENT
Start: 2022-03-14 | End: 2022-03-31

## 2022-03-26 ENCOUNTER — HOSPITAL ENCOUNTER (OUTPATIENT)
Dept: LAB | Age: 57
Discharge: HOME | End: 2022-03-26
Payer: COMMERCIAL

## 2022-03-26 DIAGNOSIS — N18.31 STAGE 3A CHRONIC KIDNEY DISEASE (HCC): ICD-10-CM

## 2022-03-26 DIAGNOSIS — I10 ESSENTIAL HYPERTENSION: ICD-10-CM

## 2022-03-26 DIAGNOSIS — R73.01 IMPAIRED FASTING BLOOD SUGAR: ICD-10-CM

## 2022-03-26 LAB
ABSOLUTE EOS #: 0.12 K/UL (ref 0–0.44)
ABSOLUTE IMMATURE GRANULOCYTE: <0.03 K/UL (ref 0–0.3)
ABSOLUTE LYMPH #: 1.44 K/UL (ref 1.1–3.7)
ABSOLUTE MONO #: 0.39 K/UL (ref 0.1–1.2)
ANION GAP SERPL CALCULATED.3IONS-SCNC: 7 MMOL/L (ref 9–17)
BASOPHILS # BLD: 1 % (ref 0–2)
BASOPHILS ABSOLUTE: 0.06 K/UL (ref 0–0.2)
BUN BLDV-MCNC: 19 MG/DL (ref 6–20)
BUN/CREAT BLD: 15 (ref 9–20)
CALCIUM SERPL-MCNC: 10.6 MG/DL (ref 8.6–10.4)
CHLORIDE BLD-SCNC: 109 MMOL/L (ref 98–107)
CO2: 29 MMOL/L (ref 20–31)
CREAT SERPL-MCNC: 1.24 MG/DL (ref 0.7–1.2)
EOSINOPHILS RELATIVE PERCENT: 2 % (ref 1–4)
GFR AFRICAN AMERICAN: >60 ML/MIN
GFR NON-AFRICAN AMERICAN: >60 ML/MIN
GFR SERPL CREATININE-BSD FRML MDRD: ABNORMAL ML/MIN/{1.73_M2}
GLUCOSE BLD-MCNC: 103 MG/DL (ref 70–99)
HCT VFR BLD CALC: 41.6 % (ref 40.7–50.3)
HEMOGLOBIN: 13.8 G/DL (ref 13–17)
IMMATURE GRANULOCYTES: 0 %
LYMPHOCYTES # BLD: 29 % (ref 24–43)
MCH RBC QN AUTO: 30.6 PG (ref 25.2–33.5)
MCHC RBC AUTO-ENTMCNC: 33.2 G/DL (ref 25.2–33.5)
MCV RBC AUTO: 92.2 FL (ref 82.6–102.9)
MONOCYTES # BLD: 8 % (ref 3–12)
NRBC AUTOMATED: 0 PER 100 WBC
PDW BLD-RTO: 12.8 % (ref 11.8–14.4)
PLATELET # BLD: 257 K/UL (ref 138–453)
PMV BLD AUTO: 9.7 FL (ref 8.1–13.5)
POTASSIUM SERPL-SCNC: 4 MMOL/L (ref 3.7–5.3)
RBC # BLD: 4.51 M/UL (ref 4.21–5.77)
SEG NEUTROPHILS: 60 % (ref 36–65)
SEGMENTED NEUTROPHILS ABSOLUTE COUNT: 2.99 K/UL (ref 1.5–8.1)
SODIUM BLD-SCNC: 145 MMOL/L (ref 135–144)
WBC # BLD: 5 K/UL (ref 3.5–11.3)

## 2022-03-26 PROCEDURE — 80048 BASIC METABOLIC PNL TOTAL CA: CPT

## 2022-03-26 PROCEDURE — 36415 COLL VENOUS BLD VENIPUNCTURE: CPT

## 2022-03-26 PROCEDURE — 83036 HEMOGLOBIN GLYCOSYLATED A1C: CPT

## 2022-03-26 PROCEDURE — 85025 COMPLETE CBC W/AUTO DIFF WBC: CPT

## 2022-03-27 LAB
ESTIMATED AVERAGE GLUCOSE: 97 MG/DL
HBA1C MFR BLD: 5 % (ref 4–6)

## 2022-03-31 ENCOUNTER — OFFICE VISIT (OUTPATIENT)
Dept: FAMILY MEDICINE CLINIC | Age: 57
End: 2022-03-31
Payer: COMMERCIAL

## 2022-03-31 VITALS
DIASTOLIC BLOOD PRESSURE: 74 MMHG | WEIGHT: 213 LBS | BODY MASS INDEX: 30.49 KG/M2 | HEIGHT: 70 IN | HEART RATE: 72 BPM | SYSTOLIC BLOOD PRESSURE: 122 MMHG

## 2022-03-31 DIAGNOSIS — G47.33 OSA ON CPAP: ICD-10-CM

## 2022-03-31 DIAGNOSIS — I10 ESSENTIAL HYPERTENSION: Primary | ICD-10-CM

## 2022-03-31 DIAGNOSIS — Z99.89 OSA ON CPAP: ICD-10-CM

## 2022-03-31 DIAGNOSIS — R73.01 IMPAIRED FASTING BLOOD SUGAR: ICD-10-CM

## 2022-03-31 DIAGNOSIS — F31.78 BIPOLAR DISORDER, IN FULL REMISSION, MOST RECENT EPISODE MIXED (HCC): ICD-10-CM

## 2022-03-31 DIAGNOSIS — M25.562 CHRONIC PAIN OF LEFT KNEE: ICD-10-CM

## 2022-03-31 DIAGNOSIS — N18.31 STAGE 3A CHRONIC KIDNEY DISEASE (HCC): ICD-10-CM

## 2022-03-31 DIAGNOSIS — G89.29 CHRONIC PAIN OF LEFT KNEE: ICD-10-CM

## 2022-03-31 DIAGNOSIS — Z12.5 SCREENING PSA (PROSTATE SPECIFIC ANTIGEN): ICD-10-CM

## 2022-03-31 PROBLEM — R00.2 PALPITATIONS: Status: ACTIVE | Noted: 2022-03-03

## 2022-03-31 PROCEDURE — 99214 OFFICE O/P EST MOD 30 MIN: CPT | Performed by: FAMILY MEDICINE

## 2022-03-31 RX ORDER — HYDRALAZINE HYDROCHLORIDE 50 MG/1
100 TABLET, FILM COATED ORAL 3 TIMES DAILY
COMMUNITY
Start: 2022-03-18

## 2022-03-31 ASSESSMENT — ENCOUNTER SYMPTOMS
GASTROINTESTINAL NEGATIVE: 1
ALLERGIC/IMMUNOLOGIC NEGATIVE: 1
BACK PAIN: 1
EYES NEGATIVE: 1
RESPIRATORY NEGATIVE: 1

## 2022-03-31 ASSESSMENT — PATIENT HEALTH QUESTIONNAIRE - PHQ9
SUM OF ALL RESPONSES TO PHQ QUESTIONS 1-9: 0
SUM OF ALL RESPONSES TO PHQ QUESTIONS 1-9: 0
SUM OF ALL RESPONSES TO PHQ9 QUESTIONS 1 & 2: 0
1. LITTLE INTEREST OR PLEASURE IN DOING THINGS: 0
SUM OF ALL RESPONSES TO PHQ QUESTIONS 1-9: 0
SUM OF ALL RESPONSES TO PHQ QUESTIONS 1-9: 0
2. FEELING DOWN, DEPRESSED OR HOPELESS: 0

## 2022-03-31 NOTE — PROGRESS NOTES
Subjective:      Patient ID: Corky Daniels is a 55 y.o. male. Hypertension    Other  Associated symptoms include arthralgias (left knee). Routine annual follow up on chronic medical conditions, refills, and review of updated labs. I have reviewed the patient's medical history in detail and updated the computerized patient record. Following with Jusat HOOK/Dr. Wally Shaw for bipolar disorder/meds. Mild seasonal dysphoria. bp readings at home running normal range. Some interval palpitations, er at [facility]. Nothing found. 48 hr holter ordered. Benign findings. Compliant with medications and cpap. Mood stable. covid week before angie. Mild sinus symptoms and fatigue. No issues otherwise. No sequale. Left knee issues long term. Not able to fully straighten. Tends to drag and swing the leg more altering gait. He describes not liking to straighten the knee. Uses a pillow under the left knee at night for comfort. Had PT which helped. Some right knee pain at present as well. Compliant with cpap. Past Medical History:   Diagnosis Date    Bipolar disorder ([facility] [address]     diagnosed 12    DENISE on CPAP 2012          Past Surgical History:   Procedure Laterality Date    COLONOSCOPY  06/16/2017    normal, Dr Marilu Bennett at [facility] yr follow up   1 [facility] N/A 03/31/2017    scalp, done by dr. Jay Leahy Right 2015    [address] [facility]  12/15/2000     Current Outpatient Medications   Medication Sig Dispense Refill    hydrALAZINE (APRESOLINE) 50 MG tablet Take 50 mg by mouth 2 times daily      Multiple Vitamins-Minerals (MULTIVITAMIN PO) Take 1 tablet by mouth daily      Omega-3 Fatty Acids (FISH OIL PO) Take 2 capsules by mouth daily      Coenzyme Q10 (CO Q 10 PO) Take 1 tablet by mouth daily      LITHIUM CARBONATE ER PO Take 600 mg by mouth daily       lamoTRIgine (LAMICTAL) 100 MG tablet Take 100 mg by mouth every morning.       lamoTRIgine (LAMICTAL) 200 MG tablet Take 200 mg by mouth nightly. No current facility-administered medications for this visit. No Known Allergies  Social History     Tobacco Use    Smoking status: Never Smoker    Smokeless tobacco: Never Used   Vaping Use    Vaping Use: Never used   Substance Use Topics    Alcohol use: Yes     Comment: minimal    Drug use: No     Family History   Problem Relation Age of Onset    Uterine Cancer Paternal Grandmother     Lung Cancer Paternal Grandmother     High Cholesterol Paternal Grandmother     Brain Cancer Paternal Grandfather            Review of Systems   Constitutional: Negative. HENT: Negative. Eyes: Negative. Respiratory: Negative. Cardiovascular: Negative. Gastrointestinal: Negative. Endocrine: Negative. Genitourinary: Negative. Musculoskeletal: Positive for arthralgias (left knee), back pain and gait problem. Skin: Negative. Allergic/Immunologic: Negative. Hematological: Negative. Psychiatric/Behavioral: Negative. Objective:   Physical Exam  Constitutional:       General: He is not in acute distress. Appearance: He is well-developed. HENT:      Head: Normocephalic and atraumatic. Right Ear: External ear normal.      Left Ear: External ear normal.      Mouth/Throat:      Pharynx: No oropharyngeal exudate. Eyes:      General: No scleral icterus. Conjunctiva/sclera: Conjunctivae normal.   Neck:      Thyroid: No thyromegaly. Cardiovascular:      Rate and Rhythm: Normal rate and regular rhythm. Heart sounds: Normal heart sounds. No murmur heard. Pulmonary:      Effort: Pulmonary effort is normal. No respiratory distress. Breath sounds: Normal breath sounds. No wheezing. Abdominal:      General: Bowel sounds are normal. There is no distension. Palpations: Abdomen is soft. Tenderness: There is no abdominal tenderness. There is no rebound.    Musculoskeletal:         General: No tenderness. Normal range of motion. Cervical back: Neck supple. Skin:     General: Skin is warm and dry. Findings: No erythema or rash. Neurological:      Mental Status: He is alert and oriented to person, place, and time. Psychiatric:         Behavior: Behavior normal.         Thought Content: Thought content normal.         Judgment: Judgment normal.     left knee xray 5/26/20  Impression   1. Mild narrowing of the medial compartment.    2. No acute fracture or dislocation.               /74 (Site: Right Upper Arm, Position: Sitting, Cuff Size: Large Adult)   Pulse 72   Ht 5' 10\" (1.778 m)   Wt 213 lb (96.6 kg)   BMI 30.56 kg/m²   Hospital Outpatient Visit on 03/26/2022   Component Date Value Ref Range Status    Glucose 03/26/2022 103* 70 - 99 mg/dL Final    BUN 03/26/2022 19  6 - 20 mg/dL Final    CREATININE 03/26/2022 1.24* 0.70 - 1.20 mg/dL Final    Bun/Cre Ratio 03/26/2022 15  9 - 20 Final    Calcium 03/26/2022 10.6* 8.6 - 10.4 mg/dL Final    Sodium 03/26/2022 145* 135 - 144 mmol/L Final    Potassium 03/26/2022 4.0  3.7 - 5.3 mmol/L Final    Chloride 03/26/2022 109* 98 - 107 mmol/L Final    CO2 03/26/2022 29  20 - 31 mmol/L Final    Anion Gap 03/26/2022 7* 9 - 17 mmol/L Final    GFR Non- 03/26/2022 >60  >60 mL/min Final    GFR  03/26/2022 >60  >60 mL/min Final    GFR Comment 03/26/2022        Final    WBC 03/26/2022 5.0  3.5 - 11.3 k/uL Final    RBC 03/26/2022 4.51  4.21 - 5.77 m/uL Final    Hemoglobin 03/26/2022 13.8  13.0 - 17.0 g/dL Final    Hematocrit 03/26/2022 41.6  40.7 - 50.3 % Final    MCV 03/26/2022 92.2  82.6 - 102.9 fL Final    MCH 03/26/2022 30.6  25.2 - 33.5 pg Final    MCHC 03/26/2022 33.2  25.2 - 33.5 g/dL Final    RDW 03/26/2022 12.8  11.8 - 14.4 % Final    Platelets 75/64/4340 257  138 - 453 k/uL Final    MPV 03/26/2022 9.7  8.1 - 13.5 fL Final    NRBC Automated 03/26/2022 0.0  0.0 per 100 WBC Final    Seg Neutrophils 03/26/2022 60  36 - 65 % Final    Lymphocytes 03/26/2022 29  24 - 43 % Final    Monocytes 03/26/2022 8  3 - 12 % Final    Eosinophils % 03/26/2022 2  1 - 4 % Final    Basophils 03/26/2022 1  0 - 2 % Final    Immature Granulocytes 03/26/2022 0  0 % Final    Segs Absolute 03/26/2022 2.99  1.50 - 8.10 k/uL Final    Absolute Lymph # 03/26/2022 1.44  1.10 - 3.70 k/uL Final    Absolute Mono # 03/26/2022 0.39  0.10 - 1.20 k/uL Final    Absolute Eos # 03/26/2022 0.12  0.00 - 0.44 k/uL Final    Basophils Absolute 03/26/2022 0.06  0.00 - 0.20 k/uL Final    Absolute Immature Granulocyte 03/26/2022 <0.03  0.00 - 0.30 k/uL Final    Hemoglobin A1C 03/26/2022 5.0  4.0 - 6.0 % Final    Estimated Avg Glucose 03/26/2022 97  mg/dL Final       Assessment:     Encounter Diagnoses   Name Primary?  Essential hypertension Yes    Bipolar disorder, in full remission, most recent episode mixed ([facility] [address]     SHANE on CPAP     Impaired fasting blood sugar     Stage 3a chronic kidney disease (HCC)     Chronic pain of left knee     Screening PSA (prostate specific antigen)            Plan:      Htn: remains elevated after starting cozaar 25mg and increasing to 50mg. Ok at present. Cont. Same and home checks. Bipolar disorder: Cont. Current meds through Dr. Alice Gusman. Stable. Reducing dose of lithium through psychiatry. Shane. Compliant, perceived benefit there, Cont. cpap nightly    Colonoscopy normal 6/16/17- 10 yr follow up. Mild ifbs at 103. Stable. Dad and gma with diabetes. Discussed dietary changes and some wt. Loss. a1c 5%    Ckd? Stage 3 GFR 53 and 54, creatinine 1.39 and 1.38 on labs 1/30/21 and 3/13/21 respectively. Currently 1.24. Concerns for lithium/cozaar use. Electrolytes normal.  . He is going to hydrate better/not avoid water during his pre lab fasting period. If still elevated, may trial off cozaar first, then discuss lithium hold.   Currently with GFR >60    Chronic left knee pain and altered gait. Xray unrevealing. Mechanical issue described. Knee does not want to extend fully, altering his gait. Right knee sore at times due to compensating for altered gait. Given chronicity and mechanical nature, referring to ortho for consult. Saw Dr. William Cole, referred to PT . He is doing better with PT. Improved at present. Discomfort comes and goes.   Some interval right knee discomfort off and on over this interval

## 2022-03-31 NOTE — PATIENT INSTRUCTIONS
Hospital Outpatient Visit on 03/26/2022   Component Date Value Ref Range Status    Glucose 03/26/2022 103* 70 - 99 mg/dL Final    BUN 03/26/2022 19  6 - 20 mg/dL Final    CREATININE 03/26/2022 1.24* 0.70 - 1.20 mg/dL Final    Bun/Cre Ratio 03/26/2022 15  9 - 20 Final    Calcium 03/26/2022 10.6* 8.6 - 10.4 mg/dL Final    Sodium 03/26/2022 145* 135 - 144 mmol/L Final    Potassium 03/26/2022 4.0  3.7 - 5.3 mmol/L Final    Chloride 03/26/2022 109* 98 - 107 mmol/L Final    CO2 03/26/2022 29  20 - 31 mmol/L Final    Anion Gap 03/26/2022 7* 9 - 17 mmol/L Final    GFR Non- 03/26/2022 >60  >60 mL/min Final    GFR  03/26/2022 >60  >60 mL/min Final    GFR Comment 03/26/2022        Final    Comment: Average GFR for 52-63 years old:   80 mL/min/1.73sq m  Chronic Kidney Disease:   <60 mL/min/1.73sq m  Kidney failure:   <15 mL/min/1.73sq m              eGFR calculated using average adult body mass.  Additional eGFR calculator available at:        mcTEL.br            WBC 03/26/2022 5.0  3.5 - 11.3 k/uL Final    RBC 03/26/2022 4.51  4.21 - 5.77 m/uL Final    Hemoglobin 03/26/2022 13.8  13.0 - 17.0 g/dL Final    Hematocrit 03/26/2022 41.6  40.7 - 50.3 % Final    MCV 03/26/2022 92.2  82.6 - 102.9 fL Final    MCH 03/26/2022 30.6  25.2 - 33.5 pg Final    MCHC 03/26/2022 33.2  25.2 - 33.5 g/dL Final    RDW 03/26/2022 12.8  11.8 - 14.4 % Final    Platelets 01/09/9384 257  138 - 453 k/uL Final    MPV 03/26/2022 9.7  8.1 - 13.5 fL Final    NRBC Automated 03/26/2022 0.0  0.0 per 100 WBC Final    Seg Neutrophils 03/26/2022 60  36 - 65 % Final    Lymphocytes 03/26/2022 29  24 - 43 % Final    Monocytes 03/26/2022 8  3 - 12 % Final    Eosinophils % 03/26/2022 2  1 - 4 % Final    Basophils 03/26/2022 1  0 - 2 % Final    Immature Granulocytes 03/26/2022 0  0 % Final    Segs Absolute 03/26/2022 2.99  1.50 - 8.10 k/uL Final    Absolute Lymph # 03/26/2022 1.44  1.10 - 3.70 k/uL Final    Absolute Mono # 03/26/2022 0.39  0.10 - 1.20 k/uL Final    Absolute Eos # 03/26/2022 0.12  0.00 - 0.44 k/uL Final    Basophils Absolute 03/26/2022 0.06  0.00 - 0.20 k/uL Final    Absolute Immature Granulocyte 03/26/2022 <0.03  0.00 - 0.30 k/uL Final    Hemoglobin A1C 03/26/2022 5.0  4.0 - 6.0 % Final    Estimated Avg Glucose 03/26/2022 97  mg/dL Final    Comment: The ADA and AACC recommend providing the estimated average glucose result to permit better   patient understanding of their HBA1c result.

## 2022-06-15 DIAGNOSIS — M25.562 LEFT KNEE PAIN, UNSPECIFIED CHRONICITY: Primary | ICD-10-CM

## 2022-06-20 ENCOUNTER — OFFICE VISIT (OUTPATIENT)
Dept: ORTHOPEDIC SURGERY | Age: 57
End: 2022-06-20
Payer: COMMERCIAL

## 2022-06-20 ENCOUNTER — HOSPITAL ENCOUNTER (OUTPATIENT)
Dept: GENERAL RADIOLOGY | Age: 57
Discharge: HOME | End: 2022-06-22
Payer: COMMERCIAL

## 2022-06-20 VITALS
WEIGHT: 213 LBS | BODY MASS INDEX: 30.49 KG/M2 | OXYGEN SATURATION: 98 % | SYSTOLIC BLOOD PRESSURE: 166 MMHG | DIASTOLIC BLOOD PRESSURE: 98 MMHG | HEIGHT: 70 IN | HEART RATE: 69 BPM

## 2022-06-20 DIAGNOSIS — M25.562 LEFT KNEE PAIN, UNSPECIFIED CHRONICITY: ICD-10-CM

## 2022-06-20 DIAGNOSIS — M17.10 ARTHRITIS OF KNEE: ICD-10-CM

## 2022-06-20 DIAGNOSIS — M25.562 LEFT KNEE PAIN, UNSPECIFIED CHRONICITY: Primary | ICD-10-CM

## 2022-06-20 PROCEDURE — 20610 DRAIN/INJ JOINT/BURSA W/O US: CPT | Performed by: ORTHOPAEDIC SURGERY

## 2022-06-20 PROCEDURE — 73562 X-RAY EXAM OF KNEE 3: CPT

## 2022-06-20 RX ORDER — METHYLPREDNISOLONE ACETATE 40 MG/ML
40 INJECTION, SUSPENSION INTRA-ARTICULAR; INTRALESIONAL; INTRAMUSCULAR; SOFT TISSUE ONCE
Status: COMPLETED | OUTPATIENT
Start: 2022-06-20 | End: 2022-06-20

## 2022-06-20 RX ORDER — BUPIVACAINE HYDROCHLORIDE 5 MG/ML
2 INJECTION, SOLUTION PERINEURAL ONCE
Status: COMPLETED | OUTPATIENT
Start: 2022-06-20 | End: 2022-06-20

## 2022-06-20 RX ORDER — LIDOCAINE HYDROCHLORIDE 10 MG/ML
2 INJECTION, SOLUTION INFILTRATION; PERINEURAL ONCE
Status: COMPLETED | OUTPATIENT
Start: 2022-06-20 | End: 2022-06-20

## 2022-06-20 RX ADMIN — BUPIVACAINE HYDROCHLORIDE 10 MG: 5 INJECTION, SOLUTION PERINEURAL at 14:03

## 2022-06-20 RX ADMIN — METHYLPREDNISOLONE ACETATE 40 MG: 40 INJECTION, SUSPENSION INTRA-ARTICULAR; INTRALESIONAL; INTRAMUSCULAR; SOFT TISSUE at 14:05

## 2022-06-20 RX ADMIN — LIDOCAINE HYDROCHLORIDE 2 ML: 10 INJECTION, SOLUTION INFILTRATION; PERINEURAL at 14:04

## 2022-06-20 NOTE — PROGRESS NOTES
Orthopedic Office Note  [address], [address]  [facility] [address]      CHIEF COMPLAINT:    Chief Complaint   Patient presents with    Knee Pain     Left       HISTORY OF PRESENT ILLNESS:      The patient is a 55 y.o. male  who presents today for follow-up of his left knee osteoarthritis and degenerative medial meniscus tear. He reports pain has been increasing in his knee. He has pain with activities of daily living now on a regular basis. Pain is moderate in intensity. Past Medical History:    Past Medical History:   Diagnosis Date    Bipolar disorder ([address]     diagnosed 12    DENISE on CPAP 2012       Past Surgical History:    Past Surgical History:   Procedure Laterality Date    COLONOSCOPY  06/16/2017    normal, Dr Gisselle Pineda at [facility] yr follow up   1 [facility] N/A 03/31/2017    scalp, done by dr. Elmore Session Right 2015    [address] [facility]  12/15/2000       Medications Prior to Admission:   Current Outpatient Medications   Medication Sig Dispense Refill    hydrALAZINE (APRESOLINE) 50 MG tablet Take 100 mg by mouth 2 times daily       Multiple Vitamins-Minerals (MULTIVITAMIN PO) Take 1 tablet by mouth daily      Omega-3 Fatty Acids (FISH OIL PO) Take 2 capsules by mouth daily      Coenzyme Q10 (CO Q 10 PO) Take 1 tablet by mouth daily      LITHIUM CARBONATE ER PO Take 600 mg by mouth daily       lamoTRIgine (LAMICTAL) 100 MG tablet Take 100 mg by mouth every morning.  lamoTRIgine (LAMICTAL) 200 MG tablet Take 200 mg by mouth nightly.        Current Facility-Administered Medications   Medication Dose Route Frequency Provider Last Rate Last Admin    methylPREDNISolone acetate (DEPO-MEDROL) injection 40 mg  40 mg Intra-artICUlar Once Carolina Jacob MD           Allergies:  Patient has no known allergies. Social History:   Social History     Tobacco Use   Smoking Status Never Smoker   Smokeless Tobacco Never Used     Social History     Substance and Sexual Activity   Alcohol Use Yes    Comment: minimal     Social History     Substance and Sexual Activity   Drug Use No       Family History:  Family History   Problem Relation Age of Onset    Uterine Cancer Paternal Grandmother     Lung Cancer Paternal Grandmother     High Cholesterol Paternal Grandmother     Brain Cancer Paternal Grandfather          REVIEW OF SYSTEMS:  Please see the ROS form attached to today's encounter. I have reviewed it with the patient during the visit. All other systems were reviewed and are negative. PHYSICAL EXAM:  On exam today of his left knee he has a mild joint effusion. He is tender along his medial joint line. No ligamentous instability. Skin is intact without erythema or warmth. Radiology:  X-rays of his left knee show moderate osteoarthritis with significant medial joint space narrowing and marginal osteophyte formation    ASSESSMENT/PLAN:  1. Left knee pain, unspecified chronicity    2. Arthritis of knee        Treatment options were discussed with the patient. He has inquired about knee arthroscopy however advised that the surgery would likely not alleviate his symptoms due to the amount of arthritis that he has. I recommended conservative treatment. He is not interested in taking medication. He would like to try corticosteroid injection. This was performed today in clinic with 1 mL of Depo-Medrol under sterile conditions. He tolerated this well. He will follow-up on an as-needed basis.         Procedures    MI ARTHROCENTESIS ASPIR&/INJ MAJOR JT/BURSA W/O Catarina Ken MD

## 2022-07-20 ENCOUNTER — PATIENT MESSAGE (OUTPATIENT)
Dept: FAMILY MEDICINE CLINIC | Age: 57
End: 2022-07-20

## 2022-07-20 NOTE — TELEPHONE ENCOUNTER
From: [facility]  To: Dr. Odilon Juan  Sent: 7/20/2022 4:18 PM EDT  Subject: CPAP replacement    My CPAP machine has a service alert. \"Motor life exceeded\". I called [address] [place] [address] ([phone]) in [place] and I need to have a prescription to have the CPAP replaced. The CPAP is 8years old and it is a ResMed S9 with the nasal pillow. If you need additional information, please let me know. [phone]  Thank you.

## 2022-07-20 NOTE — TELEPHONE ENCOUNTER
Concepcion Winchester called requesting a refill of the below medication which has been pended for you:     Requested Prescriptions     Pending Prescriptions Disp Refills    Respiratory Therapy Supplies MANDO 1 each 0     Si Device by Does not apply route at bedtime       Last Appointment Date: 3/31/2022  Next Appointment Date: 10/4/2022    No Known Allergies

## 2022-07-21 ENCOUNTER — TELEPHONE (OUTPATIENT)
Dept: FAMILY MEDICINE CLINIC | Age: 57
End: 2022-07-21

## 2022-07-21 DIAGNOSIS — G47.33 OSA ON CPAP: Primary | ICD-10-CM

## 2022-07-21 DIAGNOSIS — Z99.89 OSA ON CPAP: Primary | ICD-10-CM

## 2022-07-21 NOTE — TELEPHONE ENCOUNTER
Cher Coates from [facility] is calling and stating that they received a script for CPAP supplies. If the patient needs a replacement machine then they need a new script for a replacement machine with pressures and chart notes.  Demographics and insurance cards all faxed to [phone]

## 2022-07-22 NOTE — TELEPHONE ENCOUNTER
Genoveva Muniz called requesting a refill of the below medication which has been pended for you:     Requested Prescriptions     Pending Prescriptions Disp Refills    Respiratory Therapy Supplies MANDO 1 each 0     Si Device by Does not apply route at bedtime       Last Appointment Date: 3/31/2022  Next Appointment Date: 10/4/2022    No Known Allergies

## 2022-07-25 ENCOUNTER — TELEPHONE (OUTPATIENT)
Dept: FAMILY MEDICINE CLINIC | Age: 57
End: 2022-07-25

## 2022-07-25 DIAGNOSIS — Z99.89 OSA ON CPAP: ICD-10-CM

## 2022-07-25 DIAGNOSIS — G47.33 OSA ON CPAP: ICD-10-CM

## 2022-07-25 RX ORDER — HYDRALAZINE HYDROCHLORIDE 25 MG/1
TABLET, FILM COATED ORAL
COMMUNITY
Start: 2022-07-12 | End: 2022-10-04 | Stop reason: ALTCHOICE

## 2022-07-25 NOTE — TELEPHONE ENCOUNTER
[facility] calling stating they got a script for cpap supplies but they need chart notes, testing, demographics and chart notes to be faxed to them at [phone]

## 2022-10-01 ENCOUNTER — HOSPITAL ENCOUNTER (OUTPATIENT)
Dept: LAB | Age: 57
Discharge: HOME | End: 2022-10-01
Payer: COMMERCIAL

## 2022-10-01 DIAGNOSIS — Z12.5 SCREENING PSA (PROSTATE SPECIFIC ANTIGEN): ICD-10-CM

## 2022-10-01 DIAGNOSIS — I10 ESSENTIAL HYPERTENSION: ICD-10-CM

## 2022-10-01 DIAGNOSIS — R73.01 IMPAIRED FASTING BLOOD SUGAR: ICD-10-CM

## 2022-10-01 LAB
ABSOLUTE EOS #: 0.12 K/UL (ref 0–0.44)
ABSOLUTE IMMATURE GRANULOCYTE: <0.03 K/UL (ref 0–0.3)
ABSOLUTE LYMPH #: 1.21 K/UL (ref 1.1–3.7)
ABSOLUTE MONO #: 0.39 K/UL (ref 0.1–1.2)
ANION GAP SERPL CALCULATED.3IONS-SCNC: 9 MMOL/L (ref 9–17)
BASOPHILS # BLD: 1 % (ref 0–2)
BASOPHILS ABSOLUTE: 0.05 K/UL (ref 0–0.2)
BUN BLDV-MCNC: 17 MG/DL (ref 6–20)
BUN/CREAT BLD: 12 (ref 9–20)
CALCIUM SERPL-MCNC: 10.5 MG/DL (ref 8.6–10.4)
CHLORIDE BLD-SCNC: 110 MMOL/L (ref 98–107)
CO2: 24 MMOL/L (ref 20–31)
CREAT SERPL-MCNC: 1.42 MG/DL (ref 0.7–1.2)
EOSINOPHILS RELATIVE PERCENT: 3 % (ref 1–4)
GFR AFRICAN AMERICAN: >60 ML/MIN
GFR NON-AFRICAN AMERICAN: 52 ML/MIN
GFR SERPL CREATININE-BSD FRML MDRD: ABNORMAL ML/MIN/{1.73_M2}
GLUCOSE BLD-MCNC: 97 MG/DL (ref 70–99)
HCT VFR BLD CALC: 36.9 % (ref 40.7–50.3)
HEMOGLOBIN: 12.4 G/DL (ref 13–17)
IMMATURE GRANULOCYTES: 0 %
LYMPHOCYTES # BLD: 27 % (ref 24–43)
MCH RBC QN AUTO: 31.2 PG (ref 25.2–33.5)
MCHC RBC AUTO-ENTMCNC: 33.6 G/DL (ref 25.2–33.5)
MCV RBC AUTO: 92.9 FL (ref 82.6–102.9)
MONOCYTES # BLD: 9 % (ref 3–12)
NRBC AUTOMATED: 0 PER 100 WBC
PDW BLD-RTO: 13.5 % (ref 11.8–14.4)
PLATELET # BLD: 218 K/UL (ref 138–453)
PMV BLD AUTO: 10.3 FL (ref 8.1–13.5)
POTASSIUM SERPL-SCNC: 4.1 MMOL/L (ref 3.7–5.3)
PROSTATE SPECIFIC ANTIGEN: 1.43 NG/ML
RBC # BLD: 3.97 M/UL (ref 4.21–5.77)
SEG NEUTROPHILS: 60 % (ref 36–65)
SEGMENTED NEUTROPHILS ABSOLUTE COUNT: 2.78 K/UL (ref 1.5–8.1)
SODIUM BLD-SCNC: 143 MMOL/L (ref 135–144)
WBC # BLD: 4.6 K/UL (ref 3.5–11.3)

## 2022-10-01 PROCEDURE — 83036 HEMOGLOBIN GLYCOSYLATED A1C: CPT

## 2022-10-01 PROCEDURE — 80048 BASIC METABOLIC PNL TOTAL CA: CPT

## 2022-10-01 PROCEDURE — 36415 COLL VENOUS BLD VENIPUNCTURE: CPT

## 2022-10-01 PROCEDURE — 85025 COMPLETE CBC W/AUTO DIFF WBC: CPT

## 2022-10-01 PROCEDURE — G0103 PSA SCREENING: HCPCS

## 2022-10-04 ENCOUNTER — OFFICE VISIT (OUTPATIENT)
Dept: FAMILY MEDICINE CLINIC | Age: 57
End: 2022-10-04
Payer: COMMERCIAL

## 2022-10-04 VITALS
SYSTOLIC BLOOD PRESSURE: 110 MMHG | DIASTOLIC BLOOD PRESSURE: 70 MMHG | HEIGHT: 70 IN | BODY MASS INDEX: 27.63 KG/M2 | WEIGHT: 193 LBS | HEART RATE: 64 BPM

## 2022-10-04 DIAGNOSIS — F31.78 BIPOLAR DISORDER, IN FULL REMISSION, MOST RECENT EPISODE MIXED (HCC): ICD-10-CM

## 2022-10-04 DIAGNOSIS — G47.33 OSA ON CPAP: ICD-10-CM

## 2022-10-04 DIAGNOSIS — R73.01 IMPAIRED FASTING BLOOD SUGAR: ICD-10-CM

## 2022-10-04 DIAGNOSIS — M25.562 CHRONIC PAIN OF LEFT KNEE: ICD-10-CM

## 2022-10-04 DIAGNOSIS — G89.29 CHRONIC PAIN OF LEFT KNEE: ICD-10-CM

## 2022-10-04 DIAGNOSIS — Z99.89 OSA ON CPAP: ICD-10-CM

## 2022-10-04 DIAGNOSIS — I10 ESSENTIAL HYPERTENSION: Primary | ICD-10-CM

## 2022-10-04 DIAGNOSIS — N18.31 STAGE 3A CHRONIC KIDNEY DISEASE (HCC): ICD-10-CM

## 2022-10-04 PROCEDURE — 99214 OFFICE O/P EST MOD 30 MIN: CPT | Performed by: FAMILY MEDICINE

## 2022-10-04 SDOH — ECONOMIC STABILITY: FOOD INSECURITY: WITHIN THE PAST 12 MONTHS, THE FOOD YOU BOUGHT JUST DIDN'T LAST AND YOU DIDN'T HAVE MONEY TO GET MORE.: NEVER TRUE

## 2022-10-04 SDOH — ECONOMIC STABILITY: FOOD INSECURITY: WITHIN THE PAST 12 MONTHS, YOU WORRIED THAT YOUR FOOD WOULD RUN OUT BEFORE YOU GOT MONEY TO BUY MORE.: NEVER TRUE

## 2022-10-04 ASSESSMENT — PATIENT HEALTH QUESTIONNAIRE - PHQ9
SUM OF ALL RESPONSES TO PHQ QUESTIONS 1-9: 0
SUM OF ALL RESPONSES TO PHQ QUESTIONS 1-9: 0
4. FEELING TIRED OR HAVING LITTLE ENERGY: 0
9. THOUGHTS THAT YOU WOULD BE BETTER OFF DEAD, OR OF HURTING YOURSELF: 0
SUM OF ALL RESPONSES TO PHQ QUESTIONS 1-9: 0
1. LITTLE INTEREST OR PLEASURE IN DOING THINGS: 0
3. TROUBLE FALLING OR STAYING ASLEEP: 0
SUM OF ALL RESPONSES TO PHQ QUESTIONS 1-9: 0
5. POOR APPETITE OR OVEREATING: 0
6. FEELING BAD ABOUT YOURSELF - OR THAT YOU ARE A FAILURE OR HAVE LET YOURSELF OR YOUR FAMILY DOWN: 0
7. TROUBLE CONCENTRATING ON THINGS, SUCH AS READING THE NEWSPAPER OR WATCHING TELEVISION: 0
10. IF YOU CHECKED OFF ANY PROBLEMS, HOW DIFFICULT HAVE THESE PROBLEMS MADE IT FOR YOU TO DO YOUR WORK, TAKE CARE OF THINGS AT HOME, OR GET ALONG WITH OTHER PEOPLE: 0
SUM OF ALL RESPONSES TO PHQ9 QUESTIONS 1 & 2: 0
2. FEELING DOWN, DEPRESSED OR HOPELESS: 0
8. MOVING OR SPEAKING SO SLOWLY THAT OTHER PEOPLE COULD HAVE NOTICED. OR THE OPPOSITE, BEING SO FIGETY OR RESTLESS THAT YOU HAVE BEEN MOVING AROUND A LOT MORE THAN USUAL: 0

## 2022-10-04 ASSESSMENT — SOCIAL DETERMINANTS OF HEALTH (SDOH): HOW HARD IS IT FOR YOU TO PAY FOR THE VERY BASICS LIKE FOOD, HOUSING, MEDICAL CARE, AND HEATING?: NOT HARD AT ALL

## 2022-10-04 NOTE — PATIENT INSTRUCTIONS
Hospital Outpatient Visit on 10/01/2022   Component Date Value Ref Range Status    PSA 10/01/2022 1.43  <4.1 ng/mL Final    Comment: The Roche \"ECLIA\" assay is used. Results obtained with different assay methods cannot be   used interchangeably. Glucose 10/01/2022 97  70 - 99 mg/dL Final    BUN 10/01/2022 17  6 - 20 mg/dL Final    Creatinine 10/01/2022 1.42 (A)  0.70 - 1.20 mg/dL Final    Bun/Cre Ratio 10/01/2022 12  9 - 20 Final    Calcium 10/01/2022 10.5 (A)  8.6 - 10.4 mg/dL Final    Sodium 10/01/2022 143  135 - 144 mmol/L Final    Potassium 10/01/2022 4.1  3.7 - 5.3 mmol/L Final    Chloride 10/01/2022 110 (A)  98 - 107 mmol/L Final    CO2 10/01/2022 24  20 - 31 mmol/L Final    Anion Gap 10/01/2022 9  9 - 17 mmol/L Final    GFR Non- 10/01/2022 52 (A)  >60 mL/min Final    GFR  10/01/2022 >60  >60 mL/min Final    GFR Comment 10/01/2022        Final    Comment: Average GFR for 52-63 years old:   80 mL/min/1.73sq m  Chronic Kidney Disease:   <60 mL/min/1.73sq m  Kidney failure:   <15 mL/min/1.73sq m              GFR is a calculated value that has proven clinically to  be a more effective measure of   kidney function when reported with serum creatinine.             WBC 10/01/2022 4.6  3.5 - 11.3 k/uL Final    RBC 10/01/2022 3.97 (A)  4.21 - 5.77 m/uL Final    Hemoglobin 10/01/2022 12.4 (A)  13.0 - 17.0 g/dL Final    Hematocrit 10/01/2022 36.9 (A)  40.7 - 50.3 % Final    MCV 10/01/2022 92.9  82.6 - 102.9 fL Final    MCH 10/01/2022 31.2  25.2 - 33.5 pg Final    MCHC 10/01/2022 33.6 (A)  25.2 - 33.5 g/dL Final    RDW 10/01/2022 13.5  11.8 - 14.4 % Final    Platelets 79/80/2618 218  138 - 453 k/uL Final    MPV 10/01/2022 10.3  8.1 - 13.5 fL Final    NRBC Automated 10/01/2022 0.0  0.0 per 100 WBC Final    Seg Neutrophils 10/01/2022 60  36 - 65 % Final    Lymphocytes 10/01/2022 27  24 - 43 % Final    Monocytes 10/01/2022 9  3 - 12 % Final    Eosinophils % 10/01/2022 3  1 - 4 % Final Basophils 10/01/2022 1  0 - 2 % Final    Immature Granulocytes 10/01/2022 0  0 % Final    Segs Absolute 10/01/2022 2.78  1.50 - 8.10 k/uL Final    Absolute Lymph # 10/01/2022 1.21  1.10 - 3.70 k/uL Final    Absolute Mono # 10/01/2022 0.39  0.10 - 1.20 k/uL Final    Absolute Eos # 10/01/2022 0.12  0.00 - 0.44 k/uL Final    Basophils Absolute 10/01/2022 0.05  0.00 - 0.20 k/uL Final    Absolute Immature Granulocyte 10/01/2022 <0.03  0.00 - 0.30 k/uL Final

## 2022-10-04 NOTE — PROGRESS NOTES
Subjective:      Patient ID: Rosanna Rajput is a 55 y.o. male. Hypertension    Other  Associated symptoms include arthralgias (left knee). Routine annual follow up on chronic medical conditions, refills, and review of updated labs. I have reviewed the patient's medical history in detail and updated the computerized patient record. Following with [facility]/Dr. Oni Baeza for bipolar disorder/meds. Mild seasonal dysphoria. .  Mood good/alright at present. bp readings at home running normal range. Some interval palpitations, er at [facility]. Nothing found. 48 hr holter ordered. Benign findings. Compliant with medications and cpap. Mood stable. covid week before angie. Mild sinus symptoms and fatigue. No issues otherwise. No sequale. Left knee issues long term. Not able to fully straighten. Tends to drag and swing the leg more altering gait. He describes not liking to straighten the knee. Uses a pillow under the left knee at night for comfort. Had PT which helped. Some right knee pain at present as well. Compliant with cpap. Past Medical History:   Diagnosis Date    Bipolar disorder ([facility] [address]     diagnosed 1986    DENISE on CPAP 2012          Past Surgical History:   Procedure Laterality Date    COLONOSCOPY  06/16/2017    normal, Dr Courtney Allison at [facility] yr follow up    CYST REMOVAL N/A 03/31/2017    scalp, done by dr. Brittney Lion Right 2015    [address]  12/15/2000     Current Outpatient Medications   Medication Sig Dispense Refill    hydrALAZINE (APRESOLINE) 50 MG tablet Take 100 mg by mouth 3 times daily      Multiple Vitamins-Minerals (MULTIVITAMIN PO) Take 1 tablet by mouth daily      Omega-3 Fatty Acids (FISH OIL PO) Take 2 capsules by mouth daily      LITHIUM CARBONATE ER PO Take 900 mg by mouth daily      lamoTRIgine (LAMICTAL) 100 MG tablet Take 100 mg by mouth every morning.       lamoTRIgine (LAMICTAL) 200 MG tablet Take 200 mg by mouth nightly. No current facility-administered medications for this visit. No Known Allergies  Social History     Tobacco Use    Smoking status: Never    Smokeless tobacco: Never   Vaping Use    Vaping Use: Never used   Substance Use Topics    Alcohol use: Yes     Comment: minimal    Drug use: No     Family History   Problem Relation Age of Onset    Uterine Cancer Paternal Grandmother     Lung Cancer Paternal Grandmother     High Cholesterol Paternal Grandmother     Brain Cancer Paternal Grandfather            Review of Systems   Constitutional: Negative. HENT: Negative. Eyes: Negative. Respiratory: Negative. Cardiovascular: Negative. Gastrointestinal: Negative. Endocrine: Negative. Genitourinary: Negative. Musculoskeletal:  Positive for arthralgias (left knee), back pain and gait problem. Skin: Negative. Allergic/Immunologic: Negative. Hematological: Negative. Psychiatric/Behavioral: Negative. Objective:   Physical Exam  Constitutional:       General: He is not in acute distress. Appearance: He is well-developed. HENT:      Head: Normocephalic and atraumatic. Right Ear: External ear normal.      Left Ear: External ear normal.      Mouth/Throat:      Pharynx: No oropharyngeal exudate. Eyes:      General: No scleral icterus. Conjunctiva/sclera: Conjunctivae normal.   Neck:      Thyroid: No thyromegaly. Cardiovascular:      Rate and Rhythm: Normal rate and regular rhythm. Heart sounds: Normal heart sounds. No murmur heard. Pulmonary:      Effort: Pulmonary effort is normal. No respiratory distress. Breath sounds: Normal breath sounds. No wheezing. Abdominal:      General: Bowel sounds are normal. There is no distension. Palpations: Abdomen is soft. Tenderness: There is no abdominal tenderness. There is no rebound. Musculoskeletal:         General: No tenderness. Normal range of motion.       Cervical back: Neck supple. Right lower leg: Edema present. Left lower leg: Edema (trace pitting) present. Skin:     General: Skin is warm and dry. Findings: No erythema or rash. Neurological:      Mental Status: He is alert and oriented to person, place, and time. Psychiatric:         Behavior: Behavior normal.         Thought Content: Thought content normal.         Judgment: Judgment normal.   left knee xray 5/26/20  Impression   1. Mild narrowing of the medial compartment. 2. No acute fracture or dislocation.                /70 (Site: Right Upper Arm, Position: Sitting, Cuff Size: Large Adult)   Pulse 64   Ht 5' 10\" (1.778 m)   Wt 193 lb (87.5 kg)   BMI 27.69 kg/m²   Hospital Outpatient Visit on 10/01/2022   Component Date Value Ref Range Status    PSA 10/01/2022 1.43  <4.1 ng/mL Final    Glucose 10/01/2022 97  70 - 99 mg/dL Final    BUN 10/01/2022 17  6 - 20 mg/dL Final    Creatinine 10/01/2022 1.42 (A)  0.70 - 1.20 mg/dL Final    Bun/Cre Ratio 10/01/2022 12  9 - 20 Final    Calcium 10/01/2022 10.5 (A)  8.6 - 10.4 mg/dL Final    Sodium 10/01/2022 143  135 - 144 mmol/L Final    Potassium 10/01/2022 4.1  3.7 - 5.3 mmol/L Final    Chloride 10/01/2022 110 (A)  98 - 107 mmol/L Final    CO2 10/01/2022 24  20 - 31 mmol/L Final    Anion Gap 10/01/2022 9  9 - 17 mmol/L Final    GFR Non- 10/01/2022 52 (A)  >60 mL/min Final    GFR  10/01/2022 >60  >60 mL/min Final    GFR Comment 10/01/2022        Final    WBC 10/01/2022 4.6  3.5 - 11.3 k/uL Final    RBC 10/01/2022 3.97 (A)  4.21 - 5.77 m/uL Final    Hemoglobin 10/01/2022 12.4 (A)  13.0 - 17.0 g/dL Final    Hematocrit 10/01/2022 36.9 (A)  40.7 - 50.3 % Final    MCV 10/01/2022 92.9  82.6 - 102.9 fL Final    MCH 10/01/2022 31.2  25.2 - 33.5 pg Final    MCHC 10/01/2022 33.6 (A)  25.2 - 33.5 g/dL Final    RDW 10/01/2022 13.5  11.8 - 14.4 % Final    Platelets 65/32/3090 218  138 - 453 k/uL Final    MPV 10/01/2022 10.3  8.1 - 13.5 fL Final    NRBC Automated 10/01/2022 0.0  0.0 per 100 WBC Final    Seg Neutrophils 10/01/2022 60  36 - 65 % Final    Lymphocytes 10/01/2022 27  24 - 43 % Final    Monocytes 10/01/2022 9  3 - 12 % Final    Eosinophils % 10/01/2022 3  1 - 4 % Final    Basophils 10/01/2022 1  0 - 2 % Final    Immature Granulocytes 10/01/2022 0  0 % Final    Segs Absolute 10/01/2022 2.78  1.50 - 8.10 k/uL Final    Absolute Lymph # 10/01/2022 1.21  1.10 - 3.70 k/uL Final    Absolute Mono # 10/01/2022 0.39  0.10 - 1.20 k/uL Final    Absolute Eos # 10/01/2022 0.12  0.00 - 0.44 k/uL Final    Basophils Absolute 10/01/2022 0.05  0.00 - 0.20 k/uL Final    Absolute Immature Granulocyte 10/01/2022 <0.03  0.00 - 0.30 k/uL Final       Assessment:     Encounter Diagnoses   Name Primary? Essential hypertension Yes    Bipolar disorder, in full remission, most recent episode mixed (HCC)     DENISE on CPAP     Impaired fasting blood sugar     Stage 3a chronic kidney disease (HCC)     Chronic pain of left knee              Plan:      Htn: switched from cozaar to hydralazine through cardiology. Better control at present. Bipolar disorder: Cont. Current meds through [facility] Sangita Stauffer [facility]  Stable. Reducing dose of lithium through psychiatry. Continues on lithium and lamictal.  Seeing therapist.      Dayami Biswas. Compliant, perceived benefit there, Cont. cpap nightly    Colonoscopy normal 6/16/17- 10 yr follow up. Mild ifbs at 103. Stable. Dad and gma with diabetes. Discussed dietary changes and some wt. Loss. a1c 5%. No progression. Ckd? Stage 3 GFR 53 and 54, creatinine 1.39 and 1.38 on labs 1/30/21 and 3/13/21 respectively. Currently 1.42. Concerns for lithium/cozaar use. Electrolytes normal.  . He is going to hydrate better/not avoid water during his pre lab fasting period. If still elevated, may trial off cozaar first, then discuss lithium hold. Currently with GFR at 52    Chronic left knee pain and altered gait.   Xray unrevealing. Mechanical issue described. Knee does not want to extend fully, altering his gait. Right knee sore at times due to compensating for altered gait. Given chronicity and mechanical nature, referring to ortho for consult. Saw Dr. Jonathan Benjamin, referred to PT . He is doing better with PT. Improved at present. Discomfort comes and goes. Some interval right knee discomfort off and on over this interval      Consider flu and covid booster.

## 2022-10-05 LAB
ESTIMATED AVERAGE GLUCOSE: 80 MG/DL
HBA1C MFR BLD: 4.4 % (ref 4–6)

## 2023-01-23 NOTE — PATIENT INSTRUCTIONS
Hospital Outpatient Visit on 09/18/2021   Component Date Value Ref Range Status    PSA 09/18/2021 1.62  <4.1 ug/L Final    Comment: The Roche \"ECLIA\" assay is used. Results obtained with different assay methods cannot be   used interchangeably.  Glucose 09/18/2021 88  70 - 99 mg/dL Final    BUN 09/18/2021 20  6 - 20 mg/dL Final    CREATININE 09/18/2021 1.23* 0.70 - 1.20 mg/dL Final    Bun/Cre Ratio 09/18/2021 16  9 - 20 Final    Calcium 09/18/2021 10.3  8.6 - 10.4 mg/dL Final    Sodium 09/18/2021 143  135 - 144 mmol/L Final    Potassium 09/18/2021 4.0  3.7 - 5.3 mmol/L Final    Chloride 09/18/2021 109* 98 - 107 mmol/L Final    CO2 09/18/2021 26  20 - 31 mmol/L Final    Anion Gap 09/18/2021 8* 9 - 17 mmol/L Final    GFR Non- 09/18/2021 >60  >60 mL/min Final    GFR  09/18/2021 >60  >60 mL/min Final    GFR Comment 09/18/2021        Final    Comment: Average GFR for 52-63 years old:   80 mL/min/1.73sq m  Chronic Kidney Disease:   <60 mL/min/1.73sq m  Kidney failure:   <15 mL/min/1.73sq m              eGFR calculated using average adult body mass.  Additional eGFR calculator available at:        [email or web address]            GFR Staging 09/18/2021 NOT REPORTED   Final    WBC 09/18/2021 5.6  3.5 - 11.3 k/uL Final    RBC 09/18/2021 4.36  4.21 - 5.77 m/uL Final    Hemoglobin 09/18/2021 13.3  13.0 - 17.0 g/dL Final    Hematocrit 09/18/2021 40.6* 40.7 - 50.3 % Final    MCV 09/18/2021 93.1  82.6 - 102.9 fL Final    MCH 09/18/2021 30.5  25.2 - 33.5 pg Final    MCHC 09/18/2021 32.8  25.2 - 33.5 g/dL Final    RDW 09/18/2021 12.2  11.8 - 14.4 % Final    Platelets 62/75/6273 249  138 - 453 k/uL Final    MPV 09/18/2021 10.0  8.1 - 13.5 fL Final    NRBC Automated 09/18/2021 0.0  0.0 per 100 WBC Final    Differential Type 09/18/2021 NOT REPORTED   Final    Seg Neutrophils 09/18/2021 56  36 - 65 % Final    Lymphocytes 09/18/2021 32  24 - 43 % Final    Monocytes 09/18/2021 8  3 - 12 % Final    Eosinophils % 09/18/2021 3  1 - 4 % Final    Basophils 09/18/2021 1  0 - 2 % Final    Immature Granulocytes 09/18/2021 0  0 % Final    Segs Absolute 09/18/2021 3.08  1.50 - 8.10 k/uL Final    Absolute Lymph # 09/18/2021 1.80  1.10 - 3.70 k/uL Final    Absolute Mono # 09/18/2021 0.45  0.10 - 1.20 k/uL Final    Absolute Eos # 09/18/2021 0.19  0.00 - 0.44 k/uL Final    Basophils Absolute 09/18/2021 0.05  0.00 - 0.20 k/uL Final    Absolute Immature Granulocyte 09/18/2021 <0.03  0.00 - 0.30 k/uL Final    WBC Morphology 09/18/2021 NOT REPORTED   Final    RBC Morphology 09/18/2021 NOT REPORTED   Final    Platelet Estimate 82/85/9738 NOT REPORTED   Final Oxybutynin Counseling:  I discussed with the patient the risks of oxybutynin including but not limited to skin rash, drowsiness, dry mouth, difficulty urinating, and blurred vision.

## 2023-03-29 ENCOUNTER — OFFICE VISIT (OUTPATIENT)
Dept: PRIMARY CARE CLINIC | Age: 58
End: 2023-03-29
Payer: COMMERCIAL

## 2023-03-29 ENCOUNTER — HOSPITAL ENCOUNTER (OUTPATIENT)
Dept: GENERAL RADIOLOGY | Age: 58
Discharge: HOME | End: 2023-03-31
Payer: COMMERCIAL

## 2023-03-29 ENCOUNTER — HOSPITAL ENCOUNTER (OUTPATIENT)
Age: 58
Discharge: HOME | End: 2023-03-29
Payer: COMMERCIAL

## 2023-03-29 VITALS
WEIGHT: 190.13 LBS | HEIGHT: 70 IN | RESPIRATION RATE: 18 BRPM | OXYGEN SATURATION: 98 % | HEART RATE: 62 BPM | BODY MASS INDEX: 27.22 KG/M2 | TEMPERATURE: 97.7 F | SYSTOLIC BLOOD PRESSURE: 144 MMHG | DIASTOLIC BLOOD PRESSURE: 86 MMHG

## 2023-03-29 DIAGNOSIS — M25.512 ACUTE PAIN OF LEFT SHOULDER: Primary | ICD-10-CM

## 2023-03-29 DIAGNOSIS — M25.512 ACUTE PAIN OF LEFT SHOULDER: ICD-10-CM

## 2023-03-29 DIAGNOSIS — R73.01 IMPAIRED FASTING BLOOD SUGAR: ICD-10-CM

## 2023-03-29 DIAGNOSIS — I10 ESSENTIAL HYPERTENSION: ICD-10-CM

## 2023-03-29 LAB
ABSOLUTE EOS #: 0.17 K/UL (ref 0–0.44)
ABSOLUTE IMMATURE GRANULOCYTE: <0.03 K/UL (ref 0–0.3)
ABSOLUTE LYMPH #: 1.51 K/UL (ref 1.1–3.7)
ABSOLUTE MONO #: 0.53 K/UL (ref 0.1–1.2)
ANION GAP SERPL CALCULATED.3IONS-SCNC: 7 MMOL/L (ref 9–17)
BASOPHILS # BLD: 1 % (ref 0–2)
BASOPHILS ABSOLUTE: 0.05 K/UL (ref 0–0.2)
BUN SERPL-MCNC: 18 MG/DL (ref 6–20)
BUN/CREAT BLD: 12 (ref 9–20)
CALCIUM SERPL-MCNC: 11.1 MG/DL (ref 8.6–10.4)
CHLORIDE SERPL-SCNC: 109 MMOL/L (ref 98–107)
CO2 SERPL-SCNC: 29 MMOL/L (ref 20–31)
CREAT SERPL-MCNC: 1.46 MG/DL (ref 0.7–1.2)
EOSINOPHILS RELATIVE PERCENT: 3 % (ref 1–4)
EST. AVERAGE GLUCOSE BLD GHB EST-MCNC: 80 MG/DL
GFR SERPL CREATININE-BSD FRML MDRD: 56 ML/MIN/1.73M2
GLUCOSE SERPL-MCNC: 104 MG/DL (ref 70–99)
HBA1C MFR BLD: 4.4 % (ref 4–6)
HCT VFR BLD AUTO: 39.3 % (ref 40.7–50.3)
HGB BLD-MCNC: 12.9 G/DL (ref 13–17)
IMMATURE GRANULOCYTES: 0 %
LYMPHOCYTES # BLD: 28 % (ref 24–43)
MCH RBC QN AUTO: 30.6 PG (ref 25.2–33.5)
MCHC RBC AUTO-ENTMCNC: 32.8 G/DL (ref 25.2–33.5)
MCV RBC AUTO: 93.3 FL (ref 82.6–102.9)
MONOCYTES # BLD: 10 % (ref 3–12)
NRBC AUTOMATED: 0 PER 100 WBC
PDW BLD-RTO: 13.6 % (ref 11.8–14.4)
PLATELET # BLD AUTO: 235 K/UL (ref 138–453)
PMV BLD AUTO: 10.2 FL (ref 8.1–13.5)
POTASSIUM SERPL-SCNC: 4 MMOL/L (ref 3.7–5.3)
RBC # BLD: 4.21 M/UL (ref 4.21–5.77)
SEG NEUTROPHILS: 58 % (ref 36–65)
SEGMENTED NEUTROPHILS ABSOLUTE COUNT: 3.23 K/UL (ref 1.5–8.1)
SODIUM SERPL-SCNC: 145 MMOL/L (ref 135–144)
WBC # BLD AUTO: 5.5 K/UL (ref 3.5–11.3)

## 2023-03-29 PROCEDURE — 99213 OFFICE O/P EST LOW 20 MIN: CPT | Performed by: NURSE PRACTITIONER

## 2023-03-29 PROCEDURE — 73030 X-RAY EXAM OF SHOULDER: CPT

## 2023-03-29 PROCEDURE — 80048 BASIC METABOLIC PNL TOTAL CA: CPT

## 2023-03-29 PROCEDURE — 85025 COMPLETE CBC W/AUTO DIFF WBC: CPT

## 2023-03-29 PROCEDURE — 83036 HEMOGLOBIN GLYCOSYLATED A1C: CPT

## 2023-03-29 PROCEDURE — 36415 COLL VENOUS BLD VENIPUNCTURE: CPT

## 2023-03-29 PROCEDURE — 3077F SYST BP >= 140 MM HG: CPT | Performed by: NURSE PRACTITIONER

## 2023-03-29 PROCEDURE — 3079F DIAST BP 80-89 MM HG: CPT | Performed by: NURSE PRACTITIONER

## 2023-03-29 RX ORDER — ESCITALOPRAM OXALATE 10 MG/1
10 TABLET ORAL EVERY MORNING
COMMUNITY
Start: 2023-03-09

## 2023-03-29 ASSESSMENT — PATIENT HEALTH QUESTIONNAIRE - PHQ9
SUM OF ALL RESPONSES TO PHQ QUESTIONS 1-9: 1
3. TROUBLE FALLING OR STAYING ASLEEP: 0
9. THOUGHTS THAT YOU WOULD BE BETTER OFF DEAD, OR OF HURTING YOURSELF: 0
SUM OF ALL RESPONSES TO PHQ9 QUESTIONS 1 & 2: 1
4. FEELING TIRED OR HAVING LITTLE ENERGY: 0
SUM OF ALL RESPONSES TO PHQ QUESTIONS 1-9: 1
6. FEELING BAD ABOUT YOURSELF - OR THAT YOU ARE A FAILURE OR HAVE LET YOURSELF OR YOUR FAMILY DOWN: 0
7. TROUBLE CONCENTRATING ON THINGS, SUCH AS READING THE NEWSPAPER OR WATCHING TELEVISION: 0
1. LITTLE INTEREST OR PLEASURE IN DOING THINGS: 0
5. POOR APPETITE OR OVEREATING: 0
SUM OF ALL RESPONSES TO PHQ QUESTIONS 1-9: 1
10. IF YOU CHECKED OFF ANY PROBLEMS, HOW DIFFICULT HAVE THESE PROBLEMS MADE IT FOR YOU TO DO YOUR WORK, TAKE CARE OF THINGS AT HOME, OR GET ALONG WITH OTHER PEOPLE: 0
2. FEELING DOWN, DEPRESSED OR HOPELESS: 1
8. MOVING OR SPEAKING SO SLOWLY THAT OTHER PEOPLE COULD HAVE NOTICED. OR THE OPPOSITE, BEING SO FIGETY OR RESTLESS THAT YOU HAVE BEEN MOVING AROUND A LOT MORE THAN USUAL: 0
SUM OF ALL RESPONSES TO PHQ QUESTIONS 1-9: 1

## 2023-03-29 ASSESSMENT — ENCOUNTER SYMPTOMS: RESPIRATORY NEGATIVE: 1

## 2023-03-29 NOTE — PROGRESS NOTES
content normal.       Assessment and Plan:    XR SHOULDER LEFT (MIN 2 VIEWS)    Result Date: 3/29/2023  EXAMINATION: 3 XRAY VIEWS OF THE LEFT SHOULDER 3/29/2023 8:54 am COMPARISON: 08/28/2019. HISTORY: ORDERING SYSTEM PROVIDED HISTORY: Acute pain of left shoulder TECHNOLOGIST PROVIDED HISTORY: anterior aspect left shoulder pain X 1 month Reason for Exam: Left shoulder pain 1 month FINDINGS: The bone mineralization is within normal limits. The joint spaces appear unremarkable. No acute fractures or dislocations are seen. There is no soft tissue swelling. No bony erosions are seen. 1. No acute abnormality involving the left shoulder. Diagnosis Orders   1. Acute pain of left shoulder  [facility] Orthopedic Surgery    XR SHOULDER LEFT (MIN 2 VIEWS)        Take Tylenol as needed for pain. Rest the affected painful area. Apply Bio Freeze or [employer] as needed. Apply cold compresses intermittently as needed. As pain recedes, begin normal activities slowly as tolerated. Follow up with orthopedic in 2-3 days. The use, risks, benefits, and side effects of prescribed or recommended medications were discussed. All questions were answered and the patient/caregiver voiced understanding. No orders of the defined types were placed in this encounter.         Electronically signed by SALUD Hamilton CNP on 3/29/23 at 8:36 AM EDT

## 2023-03-30 ENCOUNTER — PATIENT MESSAGE (OUTPATIENT)
Dept: FAMILY MEDICINE CLINIC | Age: 58
End: 2023-03-30

## 2023-03-30 NOTE — TELEPHONE ENCOUNTER
From: Tanner Ugarte  To: Dr. Monika Carpenter: 3/30/2023 9:06 AM EDT  Subject: Latest Test Results    Please send my latest lab test results to Calli Tello at [facility].   Thank Keyonna Gibson

## 2023-04-03 ENCOUNTER — OFFICE VISIT (OUTPATIENT)
Dept: ORTHOPEDIC SURGERY | Age: 58
End: 2023-04-03
Payer: COMMERCIAL

## 2023-04-03 VITALS
WEIGHT: 190 LBS | SYSTOLIC BLOOD PRESSURE: 138 MMHG | HEIGHT: 70 IN | DIASTOLIC BLOOD PRESSURE: 75 MMHG | BODY MASS INDEX: 27.2 KG/M2 | HEART RATE: 80 BPM

## 2023-04-03 DIAGNOSIS — M75.22 BICEPS TENDINITIS OF LEFT UPPER EXTREMITY: Primary | ICD-10-CM

## 2023-04-03 PROCEDURE — 3075F SYST BP GE 130 - 139MM HG: CPT | Performed by: ORTHOPAEDIC SURGERY

## 2023-04-03 PROCEDURE — 3078F DIAST BP <80 MM HG: CPT | Performed by: ORTHOPAEDIC SURGERY

## 2023-04-03 PROCEDURE — 99203 OFFICE O/P NEW LOW 30 MIN: CPT | Performed by: ORTHOPAEDIC SURGERY

## 2023-04-03 NOTE — PROGRESS NOTES
Orthopedic Office Note  [place] [address] [facility]  [address] [facility] [address] [address]  [address], [address]  [place] [address]      CHIEF COMPLAINT:    Chief Complaint   Patient presents with    Shoulder Pain     Left shoulder pain         HISTORY OF PRESENT ILLNESS:      The patient is a 65 y.o. male  who presents today for left shoulder pain. Symptoms started a month ago after lifting foundation blocks. Pain is anterior in location. Past Medical History:    Past Medical History:   Diagnosis Date    Bipolar disorder ([address]     diagnosed 1986    DENISE on CPAP 2012       Past Surgical History:    Past Surgical History:   Procedure Laterality Date    COLONOSCOPY  06/16/2017    normal, Dr Katelynn Scott at [facility] yr follow up    CYST REMOVAL N/A 03/31/2017    scalp, done by dr. Favio Philippe [facility] 2015    [address]  12/15/2000       Medications Prior to Admission:   Current Outpatient Medications   Medication Sig Dispense Refill    escitalopram (LEXAPRO) 10 MG tablet Take 10 mg by mouth every morning      hydrALAZINE (APRESOLINE) 50 MG tablet Take 100 mg by mouth 3 times daily      Multiple Vitamins-Minerals (MULTIVITAMIN PO) Take 1 tablet by mouth daily      Omega-3 Fatty Acids (FISH OIL PO) Take 2 capsules by mouth daily      LITHIUM CARBONATE ER PO Take 900 mg by mouth daily      lamoTRIgine (LAMICTAL) 100 MG tablet Take 100 mg by mouth every morning. lamoTRIgine (LAMICTAL) 200 MG tablet Take 200 mg by mouth nightly. No current facility-administered medications for this visit. Allergies:  Patient has no known allergies.     Social History:   Social History     Tobacco Use   Smoking Status Never   Smokeless Tobacco Never     Social History     Substance and Sexual Activity   Alcohol Use Yes    Comment: minimal     Social History     Substance and Sexual Activity   Drug Use No

## 2023-04-25 ENCOUNTER — OFFICE VISIT (OUTPATIENT)
Dept: FAMILY MEDICINE CLINIC | Age: 58
End: 2023-04-25
Payer: COMMERCIAL

## 2023-04-25 ENCOUNTER — HOSPITAL ENCOUNTER (OUTPATIENT)
Age: 58
Setting detail: SPECIMEN
Discharge: HOME | End: 2023-04-25
Payer: COMMERCIAL

## 2023-04-25 VITALS
WEIGHT: 188 LBS | HEART RATE: 72 BPM | BODY MASS INDEX: 26.92 KG/M2 | SYSTOLIC BLOOD PRESSURE: 118 MMHG | DIASTOLIC BLOOD PRESSURE: 68 MMHG | HEIGHT: 70 IN

## 2023-04-25 DIAGNOSIS — D22.5 ATYPICAL NEVUS OF BACK: Primary | ICD-10-CM

## 2023-04-25 DIAGNOSIS — D22.5 ATYPICAL NEVUS OF BACK: ICD-10-CM

## 2023-04-25 PROCEDURE — 3074F SYST BP LT 130 MM HG: CPT | Performed by: FAMILY MEDICINE

## 2023-04-25 PROCEDURE — 3078F DIAST BP <80 MM HG: CPT | Performed by: FAMILY MEDICINE

## 2023-04-25 PROCEDURE — 99214 OFFICE O/P EST MOD 30 MIN: CPT | Performed by: FAMILY MEDICINE

## 2023-04-25 PROCEDURE — 88305 TISSUE EXAM BY PATHOLOGIST: CPT

## 2023-04-25 NOTE — PROGRESS NOTES
Subjective:      Patient ID: Shahrzad Marie is a 65 y.o. male. HPIscheduled visit for removal/biopsy of suspicious nevus on his back noted during routine exam.  Lesion judged to be black , with irregular border, and     Review of Systems    Objective:   Physical Exam  Constitutional:       General: He is not in acute distress. Appearance: He is well-developed. HENT:      Head: Normocephalic and atraumatic. Right Ear: External ear normal.      Left Ear: External ear normal.      Mouth/Throat:      Pharynx: No oropharyngeal exudate. Eyes:      General: No scleral icterus. Conjunctiva/sclera: Conjunctivae normal.   Neck:      Thyroid: No thyromegaly. Cardiovascular:      Rate and Rhythm: Normal rate and regular rhythm. Heart sounds: Normal heart sounds. No murmur heard. Pulmonary:      Effort: Pulmonary effort is normal. No respiratory distress. Breath sounds: Normal breath sounds. No wheezing. Abdominal:      General: Bowel sounds are normal. There is no distension. Palpations: Abdomen is soft. Tenderness: There is no abdominal tenderness. There is no rebound. Musculoskeletal:         General: No tenderness. Normal range of motion. Cervical back: Neck supple. Skin:     General: Skin is warm and dry. Findings: No erythema or rash. Neurological:      Mental Status: He is alert and oriented to person, place, and time. Psychiatric:         Behavior: Behavior normal.         Thought Content: Thought content normal.         Judgment: Judgment normal.   /68 (Site: Right Upper Arm, Position: Sitting, Cuff Size: Large Adult)   Pulse 72   Ht 5' 10\" (1.778 m)   Wt 188 lb (85.3 kg)   BMI 26.98 kg/m²       Assessment:      Atypical nevus, 8mm, oblong, black, irregular border. Plan:      Plan : shave biopsy to rule out melanoma.   Discussed risk of margin being positive on the bottom or sides and needing more resection if positive for

## 2023-04-27 LAB — DERMATOLOGY PATHOLOGY REPORT: NORMAL

## 2023-05-11 ENCOUNTER — OFFICE VISIT (OUTPATIENT)
Dept: FAMILY MEDICINE CLINIC | Age: 58
End: 2023-05-11

## 2023-05-11 VITALS
OXYGEN SATURATION: 95 % | SYSTOLIC BLOOD PRESSURE: 102 MMHG | WEIGHT: 196.2 LBS | DIASTOLIC BLOOD PRESSURE: 64 MMHG | BODY MASS INDEX: 28.09 KG/M2 | HEART RATE: 70 BPM | HEIGHT: 70 IN

## 2023-05-11 DIAGNOSIS — L72.3 SEBACEOUS CYST: Primary | ICD-10-CM

## 2023-05-11 ASSESSMENT — ENCOUNTER SYMPTOMS
GASTROINTESTINAL NEGATIVE: 1
EYES NEGATIVE: 1
RESPIRATORY NEGATIVE: 1

## 2023-05-11 NOTE — PROGRESS NOTES
Subjective:      Patient ID: Randall Rivers is a 65 y.o. male. HPI  scheduled visit for excision of 2 sebaceous cyst lesions from his back. Getting slowly larger and more prominent. Tender leaning back into surfaces, sometimes clothes rubbing and irritating . Never infected or drained in the past.      Past Medical History:   Diagnosis Date    Bipolar disorder ([address]     diagnosed 13    DENISE on CPAP 2012     Past Surgical History:   Procedure Laterality Date    COLONOSCOPY  06/16/2017    normal, Dr Paramjit Rajput at [facility] yr follow up   [facility] N/A 03/31/2017    scalp, done by dr. Gerardo Gomes Right 2015    [address] [facility]  12/15/2000     Current Outpatient Medications   Medication Sig Dispense Refill    Coenzyme Q10 (CO Q 10 PO) Take by mouth daily      escitalopram (LEXAPRO) 20 MG tablet Take 1 tablet by mouth every morning 30 tablet 5    hydrALAZINE (APRESOLINE) 50 MG tablet Take 2 tablets by mouth 3 times daily      Multiple Vitamins-Minerals (MULTIVITAMIN PO) Take 1 tablet by mouth daily      Omega-3 Fatty Acids (FISH OIL PO) Take 2 capsules by mouth daily      LITHIUM CARBONATE ER PO Take 900 mg by mouth daily      lamoTRIgine (LAMICTAL) 100 MG tablet Take 1 tablet by mouth every morning      lamoTRIgine (LAMICTAL) 200 MG tablet Take 1 tablet by mouth nightly       No current facility-administered medications for this visit. No Known Allergies      Review of Systems   Constitutional: Negative. HENT: Negative. Eyes: Negative. Respiratory: Negative. Cardiovascular: Negative. Gastrointestinal: Negative. Genitourinary: Negative. Musculoskeletal: Negative. Skin: Negative. Negative for wound (2 prominent lesions on back consistent with seb. cyst on prior exam.). Neurological: Negative. Psychiatric/Behavioral: Negative. Objective:   Physical Exam  Constitutional:       General: He is not in acute distress.

## 2023-05-19 ENCOUNTER — OFFICE VISIT (OUTPATIENT)
Dept: PRIMARY CARE CLINIC | Age: 58
End: 2023-05-19

## 2023-05-19 VITALS
HEIGHT: 70 IN | HEART RATE: 66 BPM | SYSTOLIC BLOOD PRESSURE: 122 MMHG | OXYGEN SATURATION: 95 % | WEIGHT: 196 LBS | DIASTOLIC BLOOD PRESSURE: 80 MMHG | BODY MASS INDEX: 28.06 KG/M2 | TEMPERATURE: 98.7 F

## 2023-05-19 DIAGNOSIS — Z48.02 VISIT FOR SUTURE REMOVAL: Primary | ICD-10-CM

## 2023-05-19 ASSESSMENT — ENCOUNTER SYMPTOMS: RESPIRATORY NEGATIVE: 1

## 2023-05-19 NOTE — PROGRESS NOTES
[facility]             [address], [address]                        Telephone [phone]             Fax [phone]     Berenice Ohara  1965  VGO:[id number]   Date of visit:  5/19/2023    Subjective:    Berenice Ohara is a 65 y.o.  male who presents to [address] [place] [address] Urgent Care today (5/19/2023) for evaluation of:    Chief Complaint   Patient presents with    Suture / Staple Removal     Pt has four sutures that need removed. He had two cysts on his upper back removed by dr. Kvng Dukes about 8 days ago. The bottom sutures look a little red and swollen. Suture / Staple Removal  The sutures were placed 7 to 10 days ago (05/11/23). He tried regular soap and water washings since the wound repair. The treatment provided no relief. His temperature was unmeasured prior to arrival. There has been no drainage from the wound. There is no redness present. There is no swelling present. There is no pain present. He has no difficulty moving the affected extremity or digit.      He has the following problem list:  Patient Active Problem List   Diagnosis    DENISE on CPAP    Bipolar disorder (HCC)    Palpitations    Essential hypertension        Current medications are:  Current Outpatient Medications   Medication Sig Dispense Refill    Coenzyme Q10 (CO Q 10 PO) Take by mouth daily      escitalopram (LEXAPRO) 20 MG tablet Take 1 tablet by mouth every morning 30 tablet 5    hydrALAZINE (APRESOLINE) 50 MG tablet Take 2 tablets by mouth 3 times daily      Multiple Vitamins-Minerals (MULTIVITAMIN PO) Take 1 tablet by mouth daily      Omega-3 Fatty Acids (FISH OIL PO) Take 2 capsules by mouth daily      LITHIUM CARBONATE ER PO Take 900 mg by mouth daily      lamoTRIgine (LAMICTAL) 100 MG tablet Take 1 tablet by mouth every morning      lamoTRIgine (LAMICTAL) 200 MG tablet Take 1 tablet by mouth nightly       No current

## 2023-06-05 RX ORDER — ESCITALOPRAM OXALATE 20 MG/1
20 TABLET ORAL EVERY MORNING
Qty: 90 TABLET | Refills: 1 | Status: SHIPPED | OUTPATIENT
Start: 2023-06-05

## 2023-08-03 ENCOUNTER — OFFICE VISIT (OUTPATIENT)
Dept: PRIMARY CARE CLINIC | Age: 58
End: 2023-08-03
Payer: COMMERCIAL

## 2023-08-03 VITALS
WEIGHT: 198 LBS | RESPIRATION RATE: 18 BRPM | BODY MASS INDEX: 28.41 KG/M2 | TEMPERATURE: 98 F | SYSTOLIC BLOOD PRESSURE: 138 MMHG | HEART RATE: 74 BPM | DIASTOLIC BLOOD PRESSURE: 88 MMHG | OXYGEN SATURATION: 98 %
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
## 2023-08-03 PROCEDURE — 99213 OFFICE O/P EST LOW 20 MIN: CPT | Performed by: NURSE PRACTITIONER

## 2023-08-03 PROCEDURE — 3079F DIAST BP 80-89 MM HG: CPT | Performed by: NURSE PRACTITIONER

## 2023-08-03 PROCEDURE — 3075F SYST BP GE 130 - 139MM HG: CPT | Performed by: NURSE PRACTITIONER

## 2023-08-03 RX ORDER — HYDRALAZINE HYDROCHLORIDE 100 MG/1
TABLET, FILM COATED ORAL
COMMUNITY
Start: 2023-07-31

## 2023-08-03 RX ORDER — PREDNISONE 20 MG/1
20 TABLET ORAL 2 TIMES DAILY
Qty: 10 TABLET | Refills: 0 | Status: SHIPPED | OUTPATIENT
Start: 2023-08-03 | End: 2023-08-08

## 2023-08-03 ASSESSMENT — PATIENT HEALTH QUESTIONNAIRE - PHQ9
SUM OF ALL RESPONSES TO PHQ QUESTIONS 1-9: 0
1. LITTLE INTEREST OR PLEASURE IN DOING THINGS: 0
2. FEELING DOWN, DEPRESSED OR HOPELESS: 0
SUM OF ALL RESPONSES TO PHQ QUESTIONS 1-9: 0
SUM OF ALL RESPONSES TO PHQ9 QUESTIONS 1 & 2: 0

## 2023-08-03 ASSESSMENT — ENCOUNTER SYMPTOMS
SHORTNESS OF BREATH: 0
DIARRHEA: 0
SORE THROAT: 0
RHINORRHEA: 0
RESPIRATORY NEGATIVE: 1

## 2023-08-03 NOTE — PROGRESS NOTES
[place] [address] [facility], [address] [place], [place] [address]on [place]                        Telephone [phone]             Fax [phone]     Marlee Mansfield  1965  JTJ:[id number]   Date of visit:  8/3/2023    Subjective:    Marlee Mansfield is a 65 y.o.  male who presents to [facility] today (8/3/2023) for evaluation of:    Chief Complaint   Patient presents with    Rash     Feet groin back for few weeks        Rash  This is a new problem. The current episode started in the past 7 days (X 2 weeks). The problem has been gradually worsening since onset. The affected locations include the back, groin, left upper leg, left lower leg, right upper leg and right lower leg. The rash is characterized by itchiness and redness. He was exposed to nothing. Pertinent negatives include no congestion, diarrhea, fever, rhinorrhea, shortness of breath or sore throat. Past treatments include anti-itch cream (benadryl, calamine lotion, lotrimin). The treatment provided no relief. Prior to rash starting, they did wash and line dry outside the sheets and comforter.      He has the following problem list:  Patient Active Problem List   Diagnosis    DENISE on CPAP    Bipolar disorder (HCC)    Palpitations    Essential hypertension        Current medications are:  Current Outpatient Medications   Medication Sig Dispense Refill    hydrALAZINE (APRESOLINE) 100 MG tablet       predniSONE (DELTASONE) 20 MG tablet Take 1 tablet by mouth 2 times daily for 5 days 10 tablet 0    escitalopram (LEXAPRO) 20 MG tablet Take 1 tablet by mouth every morning 90 tablet 1    Coenzyme Q10 (CO Q 10 PO) Take by mouth daily      Multiple Vitamins-Minerals (MULTIVITAMIN PO) Take 1 tablet by mouth daily      Omega-3 Fatty Acids (FISH OIL PO) Take 2 capsules by mouth daily      LITHIUM CARBONATE ER PO Take 900 mg by mouth daily      lamoTRIgine (LAMICTAL) 100 MG

## 2023-10-04 DIAGNOSIS — I10 ESSENTIAL HYPERTENSION: Primary | ICD-10-CM

## 2023-10-04 DIAGNOSIS — Z12.5 SCREENING PSA (PROSTATE SPECIFIC ANTIGEN): ICD-10-CM

## 2023-10-04 DIAGNOSIS — R73.01 IMPAIRED FASTING BLOOD SUGAR: ICD-10-CM

## 2023-10-10 ENCOUNTER — HOSPITAL ENCOUNTER (OUTPATIENT)
Age: 58
Discharge: HOME | End: 2023-10-10
Payer: COMMERCIAL

## 2023-10-10 DIAGNOSIS — R73.01 IMPAIRED FASTING BLOOD SUGAR: ICD-10-CM

## 2023-10-10 DIAGNOSIS — I10 ESSENTIAL HYPERTENSION: ICD-10-CM

## 2023-10-10 DIAGNOSIS — Z12.5 SCREENING PSA (PROSTATE SPECIFIC ANTIGEN): ICD-10-CM

## 2023-10-10 LAB
ANION GAP SERPL CALCULATED.3IONS-SCNC: 6 MMOL/L (ref 9–17)
BASOPHILS # BLD: 0.05 K/UL (ref 0–0.2)
BASOPHILS NFR BLD: 1 % (ref 0–2)
BUN SERPL-MCNC: 24 MG/DL (ref 6–20)
BUN/CREAT SERPL: 13 (ref 9–20)
CALCIUM SERPL-MCNC: 11 MG/DL (ref 8.6–10.4)
CHLORIDE SERPL-SCNC: 106 MMOL/L (ref 98–107)
CO2 SERPL-SCNC: 29 MMOL/L (ref 20–31)
CREAT SERPL-MCNC: 1.9 MG/DL (ref 0.7–1.2)
EOSINOPHIL # BLD: 0.1 K/UL (ref 0–0.44)
EOSINOPHILS RELATIVE PERCENT: 1 % (ref 1–4)
ERYTHROCYTE [DISTWIDTH] IN BLOOD BY AUTOMATED COUNT: 13.8 % (ref 11.8–14.4)
EST. AVERAGE GLUCOSE BLD GHB EST-MCNC: 71 MG/DL
GFR SERPL CREATININE-BSD FRML MDRD: 41 ML/MIN/1.73M2
GLUCOSE SERPL-MCNC: 91 MG/DL (ref 70–99)
HBA1C MFR BLD: 4.1 % (ref 4–6)
HCT VFR BLD AUTO: 37.5 % (ref 40.7–50.3)
HGB BLD-MCNC: 12.3 G/DL (ref 13–17)
IMM GRANULOCYTES # BLD AUTO: <0.03 K/UL (ref 0–0.3)
IMM GRANULOCYTES NFR BLD: 0 %
LYMPHOCYTES NFR BLD: 1.49 K/UL (ref 1.1–3.7)
LYMPHOCYTES RELATIVE PERCENT: 18 % (ref 24–43)
MCH RBC QN AUTO: 30.8 PG (ref 25.2–33.5)
MCHC RBC AUTO-ENTMCNC: 32.8 G/DL (ref 25.2–33.5)
MCV RBC AUTO: 94 FL (ref 82.6–102.9)
MONOCYTES NFR BLD: 0.63 K/UL (ref 0.1–1.2)
MONOCYTES NFR BLD: 8 % (ref 3–12)
NEUTROPHILS NFR BLD: 72 % (ref 36–65)
NEUTS SEG NFR BLD: 6.08 K/UL (ref 1.5–8.1)
NRBC BLD-RTO: 0 PER 100 WBC
PLATELET # BLD AUTO: 223 K/UL (ref 138–453)
PMV BLD AUTO: 10 FL (ref 8.1–13.5)
POTASSIUM SERPL-SCNC: 4 MMOL/L (ref 3.7–5.3)
PSA SERPL-MCNC: 1.61 NG/ML
RBC # BLD AUTO: 3.99 M/UL (ref 4.21–5.77)
SODIUM SERPL-SCNC: 141 MMOL/L (ref 135–144)
WBC OTHER # BLD: 8.4 K/UL (ref 3.5–11.3)

## 2023-10-10 PROCEDURE — G0103 PSA SCREENING: HCPCS

## 2023-10-10 PROCEDURE — 80048 BASIC METABOLIC PNL TOTAL CA: CPT

## 2023-10-10 PROCEDURE — 83036 HEMOGLOBIN GLYCOSYLATED A1C: CPT

## 2023-10-10 PROCEDURE — 85025 COMPLETE CBC W/AUTO DIFF WBC: CPT

## 2023-10-10 PROCEDURE — 36415 COLL VENOUS BLD VENIPUNCTURE: CPT

## 2023-10-12 ENCOUNTER — OFFICE VISIT (OUTPATIENT)
Dept: FAMILY MEDICINE CLINIC | Age: 58
End: 2023-10-12
Payer: COMMERCIAL

## 2023-10-12 VITALS
WEIGHT: 193.6 LBS | HEART RATE: 64 BPM | BODY MASS INDEX: 27.72 KG/M2 | DIASTOLIC BLOOD PRESSURE: 62 MMHG | HEIGHT: 70 IN | OXYGEN SATURATION: 95 % | SYSTOLIC BLOOD PRESSURE: 126 MMHG

## 2023-10-12 DIAGNOSIS — M25.562 CHRONIC PAIN OF LEFT KNEE: ICD-10-CM

## 2023-10-12 DIAGNOSIS — F31.78 BIPOLAR DISORDER, IN FULL REMISSION, MOST RECENT EPISODE MIXED (HCC): ICD-10-CM

## 2023-10-12 DIAGNOSIS — G47.33 OSA ON CPAP: ICD-10-CM

## 2023-10-12 DIAGNOSIS — G89.29 CHRONIC PAIN OF LEFT KNEE: ICD-10-CM

## 2023-10-12 DIAGNOSIS — I10 ESSENTIAL HYPERTENSION: Primary | ICD-10-CM

## 2023-10-12 DIAGNOSIS — R73.01 IMPAIRED FASTING BLOOD SUGAR: ICD-10-CM

## 2023-10-12 DIAGNOSIS — Z12.5 SCREENING PSA (PROSTATE SPECIFIC ANTIGEN): ICD-10-CM

## 2023-10-12 DIAGNOSIS — N18.31 STAGE 3A CHRONIC KIDNEY DISEASE (HCC): ICD-10-CM

## 2023-10-12 PROCEDURE — 3074F SYST BP LT 130 MM HG: CPT | Performed by: FAMILY MEDICINE

## 2023-10-12 PROCEDURE — 99214 OFFICE O/P EST MOD 30 MIN: CPT | Performed by: FAMILY MEDICINE

## 2023-10-12 PROCEDURE — 3078F DIAST BP <80 MM HG: CPT | Performed by: FAMILY MEDICINE

## 2023-10-12 ASSESSMENT — ENCOUNTER SYMPTOMS
EYES NEGATIVE: 1
RESPIRATORY NEGATIVE: 1
BACK PAIN: 1
ALLERGIC/IMMUNOLOGIC NEGATIVE: 1
GASTROINTESTINAL NEGATIVE: 1

## 2023-10-12 NOTE — PROGRESS NOTES
Subjective:      Patient ID: Teresa Serrano is a 65 y.o. male. Hypertension    Other  Associated symptoms include arthralgias (left knee). Routine annual follow up on chronic medical conditions, refills, and review of updated labs. I have reviewed the patient's medical history in detail and updated the computerized patient record. Following with [facility]/Dr. Destiny Valente for bipolar disorder/meds. Mild seasonal dysphoria. Mood becoming seasonally down at present. Poor motivation at present. No severe symptoms. bp readings at home running normal range. Some interval palpitations, er at [facility]. Nothing found. 48 hr holter ordered. Benign findings. Compliant with medications and cpap. Left knee issues long term. Not able to fully straighten. Tends to drag and swing the leg more altering gait. He describes not liking to straighten the knee. Uses a pillow under the left knee at night for comfort. Had PT which helped. Some right knee pain at present as well. Clicking with rotation. Both knees doing ok at present. Compliant with cpap.       Past Medical History:   Diagnosis Date    Bipolar disorder ([address])     diagnosed 1986    DENISE on CPAP 2012          Past Surgical History:   Procedure Laterality Date    COLONOSCOPY  06/16/2017    normal, Dr Nita Lucas at [facility] yr follow up    CYST REMOVAL N/A 03/31/2017    scalp, done by dr. Shannon Alex Right 2015    [address]  12/15/2000     Current Outpatient Medications   Medication Sig Dispense Refill    hydrALAZINE (APRESOLINE) 100 MG tablet       escitalopram (LEXAPRO) 20 MG tablet Take 1 tablet by mouth every morning 90 tablet 1    Coenzyme Q10 (CO Q 10 PO) Take by mouth daily      Multiple Vitamins-Minerals (MULTIVITAMIN PO) Take 1 tablet by mouth daily      Omega-3 Fatty Acids (FISH OIL PO) Take 2 capsules by mouth daily      LITHIUM CARBONATE ER PO Take 900 mg by mouth daily      lamoTRIgine

## 2023-10-12 NOTE — PATIENT INSTRUCTIONS
Hospital Outpatient Visit on 10/10/2023   Component Date Value Ref Range Status    PSA 10/10/2023 1.61  <4.1 ng/mL Final    Comment: The Roche \"ECLIA\" assay is used. Results obtained with different assay methods cannot be   used interchangeably. Hemoglobin A1C 10/10/2023 4.1  4.0 - 6.0 % Final    Estimated Avg Glucose 10/10/2023 71  mg/dL Final    Comment: The ADA and AACC recommend providing the estimated average glucose result to permit better   patient understanding of their HBA1c result. Sodium 10/10/2023 141  135 - 144 mmol/L Final    Potassium 10/10/2023 4.0  3.7 - 5.3 mmol/L Final    Chloride 10/10/2023 106  98 - 107 mmol/L Final    CO2 10/10/2023 29  20 - 31 mmol/L Final    Anion Gap 10/10/2023 6 (L)  9 - 17 mmol/L Final    Glucose 10/10/2023 91  70 - 99 mg/dL Final    BUN 10/10/2023 24 (H)  6 - 20 mg/dL Final    Creatinine 10/10/2023 1.9 (H)  0.7 - 1.2 mg/dL Final    Est, Glom Filt Rate 10/10/2023 41 (L)  >60 mL/min/1.73m2 Final    Comment:       These results are not intended for use in patients <25years of age. eGFR results are calculated without a race factor using the 2021 CKD-EPI equation. Careful clinical correlation is recommended, particularly when comparing to results   calculated using previous equations. The CKD-EPI equation is less accurate in patients with extremes of muscle mass, extra-renal   metabolism of creatine, excessive creatine ingestion, or following therapy that affects   renal tubular secretion.       Bun/Cre Ratio 10/10/2023 13  9 - 20 Final    Calcium 10/10/2023 11.0 (H)  8.6 - 10.4 mg/dL Final    WBC 10/10/2023 8.4  3.5 - 11.3 k/uL Final    RBC 10/10/2023 3.99 (L)  4.21 - 5.77 m/uL Final    Hemoglobin 10/10/2023 12.3 (L)  13.0 - 17.0 g/dL Final    Hematocrit 10/10/2023 37.5 (L)  40.7 - 50.3 % Final    MCV 10/10/2023 94.0  82.6 - 102.9 fL Final    MCH 10/10/2023 30.8  25.2 - 33.5 pg Final    MCHC 10/10/2023 32.8  25.2 - 33.5 g/dL Final    RDW 10/10/2023 13.8

## 2023-11-21 ENCOUNTER — OFFICE VISIT (OUTPATIENT)
Dept: FAMILY MEDICINE CLINIC | Age: 58
End: 2023-11-21
Payer: COMMERCIAL

## 2023-11-21 ENCOUNTER — TELEPHONE (OUTPATIENT)
Dept: FAMILY MEDICINE CLINIC | Age: 58
End: 2023-11-21

## 2023-11-21 VITALS
HEIGHT: 70 IN | HEART RATE: 68 BPM | OXYGEN SATURATION: 97 % | DIASTOLIC BLOOD PRESSURE: 74 MMHG | WEIGHT: 189.8 LBS | SYSTOLIC BLOOD PRESSURE: 132 MMHG | BODY MASS INDEX: 27.17 KG/M2

## 2023-11-21 DIAGNOSIS — Z79.899: ICD-10-CM

## 2023-11-21 DIAGNOSIS — F31.76 BIPOLAR DISORDER, IN FULL REMISSION, MOST RECENT EPISODE DEPRESSED (HCC): Primary | ICD-10-CM

## 2023-11-21 PROCEDURE — 3075F SYST BP GE 130 - 139MM HG: CPT | Performed by: FAMILY MEDICINE

## 2023-11-21 PROCEDURE — 3078F DIAST BP <80 MM HG: CPT | Performed by: FAMILY MEDICINE

## 2023-11-21 PROCEDURE — 99214 OFFICE O/P EST MOD 30 MIN: CPT | Performed by: FAMILY MEDICINE

## 2023-11-21 ASSESSMENT — ENCOUNTER SYMPTOMS
RESPIRATORY NEGATIVE: 1
GASTROINTESTINAL NEGATIVE: 1
EYES NEGATIVE: 1

## 2023-11-21 NOTE — TELEPHONE ENCOUNTER
Davian Flynn is calling stating that patient needs the back to work note to say he has no restrictions. Please fax this to [phone].

## 2023-12-27 ENCOUNTER — OFFICE VISIT (OUTPATIENT)
Dept: FAMILY MEDICINE CLINIC | Age: 58
End: 2023-12-27
Payer: COMMERCIAL

## 2023-12-27 VITALS
TEMPERATURE: 98.3 F | WEIGHT: 184.2 LBS | SYSTOLIC BLOOD PRESSURE: 150 MMHG | HEIGHT: 70 IN | HEART RATE: 60 BPM | OXYGEN SATURATION: 96 % | DIASTOLIC BLOOD PRESSURE: 84 MMHG | BODY MASS INDEX: 26.37 KG/M2

## 2023-12-27 DIAGNOSIS — J01.90 ACUTE BACTERIAL SINUSITIS: Primary | ICD-10-CM

## 2023-12-27 DIAGNOSIS — B96.89 ACUTE BACTERIAL SINUSITIS: Primary | ICD-10-CM

## 2023-12-27 PROCEDURE — 3079F DIAST BP 80-89 MM HG: CPT | Performed by: FAMILY MEDICINE

## 2023-12-27 PROCEDURE — 99213 OFFICE O/P EST LOW 20 MIN: CPT | Performed by: FAMILY MEDICINE

## 2023-12-27 PROCEDURE — 3077F SYST BP >= 140 MM HG: CPT | Performed by: FAMILY MEDICINE

## 2023-12-27 RX ORDER — BUSPIRONE HYDROCHLORIDE 10 MG/1
10 TABLET ORAL 3 TIMES DAILY
COMMUNITY
Start: 2023-12-23 | End: 2024-01-22

## 2023-12-27 RX ORDER — TRAZODONE HYDROCHLORIDE 50 MG/1
50 TABLET ORAL NIGHTLY PRN
COMMUNITY
Start: 2023-12-24

## 2023-12-27 RX ORDER — AMOXICILLIN AND CLAVULANATE POTASSIUM 875; 125 MG/1; MG/1
1 TABLET, FILM COATED ORAL 2 TIMES DAILY
Qty: 20 TABLET | Refills: 0 | Status: SHIPPED | OUTPATIENT
Start: 2023-12-27 | End: 2024-01-06

## 2023-12-27 RX ORDER — HYDROXYZINE 50 MG/1
50 TABLET, FILM COATED ORAL 3 TIMES DAILY PRN
COMMUNITY
Start: 2023-12-23 | End: 2024-01-22

## 2023-12-27 NOTE — PROGRESS NOTES
Subjective:      Patient ID: Ever Ortiz is a 63 y.o. male. HPI  acute visit for recent uri with persistent sinus drainage and congestion. Yellow discharge from the nose, hoarse voice. Minimal cough   Recent hospitalization for bipolar complications, hypomanic. Stable long term on lithium. Seeing some elevations in renal function leading to medication changes. Symptoms peaked and led to 4 day psych admission. Medication changes reviewed. Spouse diagnosed with breast cancer last week. More stress at home. Past Medical History:   Diagnosis Date    Bipolar disorder ([address])     diagnosed 1986    DENISE on CPAP 2012     Past Surgical History:   Procedure Laterality Date    COLONOSCOPY  06/16/2017    normal, Dr Magdalena Bueno at [facility] yr follow up    CYST REMOVAL N/A 03/31/2017    scalp, done by dr. Maddie Timmons Right 2015    [address]  12/15/2000     Current Outpatient Medications   Medication Sig Dispense Refill    busPIRone (BUSPAR) 10 MG tablet Take 1 tablet by mouth 3 times daily      hydrOXYzine HCl (ATARAX) 50 MG tablet Take 1 tablet by mouth 3 times daily as needed      traZODone (DESYREL) 50 MG tablet Take 1 tablet by mouth nightly as needed for Sleep      hydrALAZINE (APRESOLINE) 100 MG tablet       Coenzyme Q10 (CO Q 10 PO) Take by mouth daily      Multiple Vitamins-Minerals (MULTIVITAMIN PO) Take 1 tablet by mouth daily      Omega-3 Fatty Acids (FISH OIL PO) Take 2 capsules by mouth daily      lamoTRIgine (LAMICTAL) 200 MG tablet Take 1 tablet by mouth nightly       No current facility-administered medications for this visit. Review of Systems   Constitutional: Negative. HENT: Negative. Eyes: Negative. Respiratory: Negative. Cardiovascular: Negative. Gastrointestinal: Negative. Endocrine: Negative. Genitourinary: Negative. Musculoskeletal: Negative. Skin: Negative. Allergic/Immunologic: Negative.     Neurological:

## 2024-02-07 ENCOUNTER — APPOINTMENT (OUTPATIENT)
Dept: GENERAL RADIOLOGY | Age: 59
End: 2024-02-07
Payer: COMMERCIAL

## 2024-02-07 ENCOUNTER — TELEPHONE (OUTPATIENT)
Dept: FAMILY MEDICINE CLINIC | Age: 59
End: 2024-02-07

## 2024-02-07 ENCOUNTER — HOSPITAL ENCOUNTER (EMERGENCY)
Age: 59
Discharge: HOME | End: 2024-02-07
Attending: EMERGENCY MEDICINE
Payer: COMMERCIAL

## 2024-02-07 VITALS
RESPIRATION RATE: 19 BRPM | HEART RATE: 55 BPM | OXYGEN SATURATION: 98 % | TEMPERATURE: 97.7 F | BODY MASS INDEX: 28.63 KG/M2 | SYSTOLIC BLOOD PRESSURE: 136 MMHG | HEIGHT: 70 IN | WEIGHT: 200 LBS | DIASTOLIC BLOOD PRESSURE: 90 MMHG

## 2024-02-07 DIAGNOSIS — I10 HYPERTENSION, UNSPECIFIED TYPE: Primary | ICD-10-CM

## 2024-02-07 LAB
ANION GAP SERPL CALCULATED.3IONS-SCNC: 9 MMOL/L (ref 9–17)
BUN SERPL-MCNC: 28 MG/DL (ref 6–20)
BUN/CREAT SERPL: 22 (ref 9–20)
CALCIUM SERPL-MCNC: 10.4 MG/DL (ref 8.6–10.4)
CHLORIDE SERPL-SCNC: 105 MMOL/L (ref 98–107)
CO2 SERPL-SCNC: 30 MMOL/L (ref 20–31)
CREAT SERPL-MCNC: 1.3 MG/DL (ref 0.7–1.2)
ERYTHROCYTE [DISTWIDTH] IN BLOOD BY AUTOMATED COUNT: 12.5 % (ref 11.8–14.4)
GFR SERPL CREATININE-BSD FRML MDRD: >60 ML/MIN/1.73M2
GLUCOSE SERPL-MCNC: 99 MG/DL (ref 70–99)
HCT VFR BLD AUTO: 38.5 % (ref 40.7–50.3)
HGB BLD-MCNC: 12.6 G/DL (ref 13–17)
MAGNESIUM SERPL-MCNC: 2.4 MG/DL (ref 1.6–2.6)
MCH RBC QN AUTO: 30.3 PG (ref 25.2–33.5)
MCHC RBC AUTO-ENTMCNC: 32.7 G/DL (ref 25.2–33.5)
MCV RBC AUTO: 92.5 FL (ref 82.6–102.9)
NRBC BLD-RTO: 0 PER 100 WBC
PLATELET # BLD AUTO: 203 K/UL (ref 138–453)
PMV BLD AUTO: 9.5 FL (ref 8.1–13.5)
POTASSIUM SERPL-SCNC: 4.1 MMOL/L (ref 3.7–5.3)
RBC # BLD AUTO: 4.16 M/UL (ref 4.21–5.77)
SODIUM SERPL-SCNC: 144 MMOL/L (ref 135–144)
TROPONIN I SERPL HS-MCNC: 21 NG/L (ref 0–22)
TROPONIN I SERPL HS-MCNC: 23 NG/L (ref 0–22)
WBC OTHER # BLD: 4.9 K/UL (ref 3.5–11.3)

## 2024-02-07 PROCEDURE — 6370000000 HC RX 637 (ALT 250 FOR IP): Performed by: EMERGENCY MEDICINE

## 2024-02-07 PROCEDURE — 84484 ASSAY OF TROPONIN QUANT: CPT

## 2024-02-07 PROCEDURE — 80048 BASIC METABOLIC PNL TOTAL CA: CPT

## 2024-02-07 PROCEDURE — 36415 COLL VENOUS BLD VENIPUNCTURE: CPT

## 2024-02-07 PROCEDURE — 71045 X-RAY EXAM CHEST 1 VIEW: CPT

## 2024-02-07 PROCEDURE — 96374 THER/PROPH/DIAG INJ IV PUSH: CPT

## 2024-02-07 PROCEDURE — 6370000000 HC RX 637 (ALT 250 FOR IP)

## 2024-02-07 PROCEDURE — 85027 COMPLETE CBC AUTOMATED: CPT

## 2024-02-07 PROCEDURE — 83735 ASSAY OF MAGNESIUM: CPT

## 2024-02-07 PROCEDURE — 99285 EMERGENCY DEPT VISIT HI MDM: CPT

## 2024-02-07 PROCEDURE — 2580000003 HC RX 258: Performed by: EMERGENCY MEDICINE

## 2024-02-07 PROCEDURE — 6360000002 HC RX W HCPCS

## 2024-02-07 PROCEDURE — 93005 ELECTROCARDIOGRAM TRACING: CPT | Performed by: EMERGENCY MEDICINE

## 2024-02-07 RX ORDER — 0.9 % SODIUM CHLORIDE 0.9 %
1000 INTRAVENOUS SOLUTION INTRAVENOUS ONCE
Status: COMPLETED | OUTPATIENT
Start: 2024-02-07 | End: 2024-02-07

## 2024-02-07 RX ORDER — QUETIAPINE FUMARATE 25 MG/1
25 TABLET, FILM COATED ORAL NIGHTLY PRN
COMMUNITY

## 2024-02-07 RX ORDER — BUSPIRONE HYDROCHLORIDE 10 MG/1
10 TABLET ORAL 3 TIMES DAILY
COMMUNITY

## 2024-02-07 RX ORDER — LABETALOL HYDROCHLORIDE 5 MG/ML
INJECTION, SOLUTION INTRAVENOUS
Status: COMPLETED
Start: 2024-02-07 | End: 2024-02-07

## 2024-02-07 RX ORDER — CLONIDINE HYDROCHLORIDE 0.1 MG/1
TABLET ORAL
Status: DISCONTINUED
Start: 2024-02-07 | End: 2024-02-07 | Stop reason: HOSPADM

## 2024-02-07 RX ORDER — ASPIRIN 81 MG/1
324 TABLET, CHEWABLE ORAL ONCE
Status: COMPLETED | OUTPATIENT
Start: 2024-02-07 | End: 2024-02-07

## 2024-02-07 RX ORDER — LABETALOL HYDROCHLORIDE 5 MG/ML
10 INJECTION, SOLUTION INTRAVENOUS ONCE
Status: COMPLETED | OUTPATIENT
Start: 2024-02-07 | End: 2024-02-07

## 2024-02-07 RX ORDER — ASPIRIN 81 MG/1
TABLET, CHEWABLE ORAL
Status: COMPLETED
Start: 2024-02-07 | End: 2024-02-07

## 2024-02-07 RX ORDER — CLONIDINE HYDROCHLORIDE 0.1 MG/1
0.3 TABLET ORAL ONCE
Status: COMPLETED | OUTPATIENT
Start: 2024-02-07 | End: 2024-02-07

## 2024-02-07 RX ADMIN — LABETALOL HYDROCHLORIDE 10 MG: 5 INJECTION, SOLUTION INTRAVENOUS at 09:13

## 2024-02-07 RX ADMIN — CLONIDINE HYDROCHLORIDE 0.3 MG: 0.1 TABLET ORAL at 09:58

## 2024-02-07 RX ADMIN — SODIUM CHLORIDE 1000 ML: 9 INJECTION, SOLUTION INTRAVENOUS at 09:10

## 2024-02-07 RX ADMIN — ASPIRIN 81 MG 324 MG: 81 TABLET ORAL at 09:12

## 2024-02-07 RX ADMIN — ASPIRIN 324 MG: 81 TABLET, CHEWABLE ORAL at 09:12

## 2024-02-07 RX ADMIN — LABETALOL HYDROCHLORIDE 10 MG: 5 INJECTION INTRAVENOUS at 09:13

## 2024-02-07 ASSESSMENT — ENCOUNTER SYMPTOMS
SHORTNESS OF BREATH: 0
DIARRHEA: 0
VOMITING: 0
SORE THROAT: 0

## 2024-02-07 ASSESSMENT — PAIN DESCRIPTION - DESCRIPTORS: DESCRIPTORS: PRESSURE

## 2024-02-07 ASSESSMENT — PAIN DESCRIPTION - LOCATION: LOCATION: CHEST

## 2024-02-07 ASSESSMENT — PAIN DESCRIPTION - PAIN TYPE: TYPE: ACUTE PAIN

## 2024-02-07 ASSESSMENT — PAIN DESCRIPTION - FREQUENCY: FREQUENCY: CONTINUOUS

## 2024-02-07 ASSESSMENT — PAIN DESCRIPTION - ORIENTATION: ORIENTATION: MID

## 2024-02-07 ASSESSMENT — PAIN - FUNCTIONAL ASSESSMENT: PAIN_FUNCTIONAL_ASSESSMENT: 0-10

## 2024-02-07 ASSESSMENT — PAIN SCALES - GENERAL: PAINLEVEL_OUTOF10: 5

## 2024-02-07 NOTE — TELEPHONE ENCOUNTER
LMTRC. Pt was seen in ED today for high BP. He is scheduled for 2/12. If pt needs seen sooner, can try to work him in on Friday, if possible. Did send mychart message as well.

## 2024-02-07 NOTE — ED PROVIDER NOTES
Abnormal; Notable for the following components:    Troponin, High Sensitivity 23 (*)     All other components within normal limits   MAGNESIUM   TROPONIN       All other labs were within normal range or not returned as of this dictation.    RADIOLOGY:  XR CHEST PORTABLE   Final Result   Minimal pulmonary vascular congestion.      Mild cardiomegaly.               ED Course as of 02/07/24 1042   Wed Feb 07, 2024   0947 Patient's laboratory results revealed mild elevation in creatinine which is chronic, he does have stage III chronic kidney disease.  His initial troponin is elevated at 23 which I suspect is from the creatinine.  He will need a repeat troponin, I have ordered some normal diet and I am now going to order some clonidine for the blood pressure. [TS]   0957 Chest x-ray shows mild cardiomegaly and mild vascular congestion [TS]   1041 Blood pressure improved in the ED.  Second troponin lower than the first.  I do believe this is asymptomatic hypertension at this time with stage III chronic kidney disease I am going to discharge with instructions to follow-up with PCP for reevaluation and further treatment    At this time the patient is without objective evidence of an acute process requiring hospitalization or inpatient management. They have remained hemodynamically stable and are stable for discharge with outpatient follow-up.     Standard anticipatory guidance given to patient upon discharge.  Have given them a specific time frame in which to follow-up and who to follow-up with.  I have also advised them that they should return to the emergency department if they get worse, or not getting better or develop any new or concerning symptoms.  Patient demonstrates understanding.   [TS]      ED Course User Index  [TS] Patrice Childs,          PROCEDURES:  Unless otherwise noted below, none     Procedures    FINAL IMPRESSION      1. Hypertension, unspecified type          DISPOSITION/PLAN   DISPOSITION

## 2024-02-09 LAB
EKG ATRIAL RATE: 72 BPM
EKG P AXIS: 68 DEGREES
EKG P-R INTERVAL: 210 MS
EKG Q-T INTERVAL: 438 MS
EKG QRS DURATION: 112 MS
EKG QTC CALCULATION (BAZETT): 479 MS
EKG R AXIS: -82 DEGREES
EKG T AXIS: 58 DEGREES
EKG VENTRICULAR RATE: 72 BPM

## 2024-02-11 ENCOUNTER — HOSPITAL ENCOUNTER (EMERGENCY)
Age: 59
Discharge: HOME | End: 2024-02-11
Attending: EMERGENCY MEDICINE
Payer: COMMERCIAL

## 2024-02-11 VITALS
WEIGHT: 190 LBS | BODY MASS INDEX: 27.2 KG/M2 | TEMPERATURE: 97 F | OXYGEN SATURATION: 97 % | RESPIRATION RATE: 18 BRPM | HEART RATE: 96 BPM | DIASTOLIC BLOOD PRESSURE: 97 MMHG | SYSTOLIC BLOOD PRESSURE: 149 MMHG | HEIGHT: 70 IN

## 2024-02-11 DIAGNOSIS — R07.89 ATYPICAL CHEST PAIN: ICD-10-CM

## 2024-02-11 DIAGNOSIS — I10 HYPERTENSION, UNSPECIFIED TYPE: Primary | ICD-10-CM

## 2024-02-11 LAB
ALBUMIN SERPL-MCNC: 4.4 G/DL (ref 3.5–5.2)
ALBUMIN/GLOB SERPL: 2.2 {RATIO} (ref 1–2.5)
ALP SERPL-CCNC: 63 U/L (ref 40–129)
ALT SERPL-CCNC: 31 U/L (ref 5–41)
ANION GAP SERPL CALCULATED.3IONS-SCNC: 10 MMOL/L (ref 9–17)
AST SERPL-CCNC: 34 U/L
BASOPHILS # BLD: 0.04 K/UL (ref 0–0.2)
BASOPHILS NFR BLD: 1 % (ref 0–2)
BILIRUB SERPL-MCNC: 0.2 MG/DL (ref 0.3–1.2)
BUN SERPL-MCNC: 28 MG/DL (ref 6–20)
BUN/CREAT SERPL: 20 (ref 9–20)
CALCIUM SERPL-MCNC: 10.1 MG/DL (ref 8.6–10.4)
CHLORIDE SERPL-SCNC: 106 MMOL/L (ref 98–107)
CO2 SERPL-SCNC: 27 MMOL/L (ref 20–31)
CREAT SERPL-MCNC: 1.4 MG/DL (ref 0.7–1.2)
D DIMER PPP FEU-MCNC: 0.29 UG/ML FEU (ref 0–0.59)
EKG ATRIAL RATE: 72 BPM
EKG ATRIAL RATE: 76 BPM
EKG P AXIS: 59 DEGREES
EKG P AXIS: 64 DEGREES
EKG P-R INTERVAL: 214 MS
EKG P-R INTERVAL: 220 MS
EKG Q-T INTERVAL: 438 MS
EKG Q-T INTERVAL: 444 MS
EKG QRS DURATION: 116 MS
EKG QRS DURATION: 120 MS
EKG QTC CALCULATION (BAZETT): 479 MS
EKG QTC CALCULATION (BAZETT): 499 MS
EKG R AXIS: -67 DEGREES
EKG R AXIS: -71 DEGREES
EKG T AXIS: 63 DEGREES
EKG T AXIS: 65 DEGREES
EKG VENTRICULAR RATE: 72 BPM
EKG VENTRICULAR RATE: 76 BPM
EOSINOPHIL # BLD: 0.15 K/UL (ref 0–0.44)
EOSINOPHILS RELATIVE PERCENT: 4 % (ref 1–4)
ERYTHROCYTE [DISTWIDTH] IN BLOOD BY AUTOMATED COUNT: 12.4 % (ref 11.8–14.4)
GFR SERPL CREATININE-BSD FRML MDRD: 59 ML/MIN/1.73M2
GLUCOSE SERPL-MCNC: 126 MG/DL (ref 70–99)
HCT VFR BLD AUTO: 37.6 % (ref 40.7–50.3)
HGB BLD-MCNC: 12.6 G/DL (ref 13–17)
IMM GRANULOCYTES # BLD AUTO: <0.03 K/UL (ref 0–0.3)
IMM GRANULOCYTES NFR BLD: 0 %
LYMPHOCYTES NFR BLD: 1.46 K/UL (ref 1.1–3.7)
LYMPHOCYTES RELATIVE PERCENT: 34 % (ref 24–43)
MAGNESIUM SERPL-MCNC: 2.3 MG/DL (ref 1.6–2.6)
MCH RBC QN AUTO: 30.4 PG (ref 25.2–33.5)
MCHC RBC AUTO-ENTMCNC: 33.5 G/DL (ref 25.2–33.5)
MCV RBC AUTO: 90.6 FL (ref 82.6–102.9)
MONOCYTES NFR BLD: 0.45 K/UL (ref 0.1–1.2)
MONOCYTES NFR BLD: 10 % (ref 3–12)
NEUTROPHILS NFR BLD: 51 % (ref 36–65)
NEUTS SEG NFR BLD: 2.21 K/UL (ref 1.5–8.1)
NRBC BLD-RTO: 0 PER 100 WBC
PLATELET # BLD AUTO: 213 K/UL (ref 138–453)
PMV BLD AUTO: 9.7 FL (ref 8.1–13.5)
POTASSIUM SERPL-SCNC: 3.5 MMOL/L (ref 3.7–5.3)
PROT SERPL-MCNC: 6.4 G/DL (ref 6.4–8.3)
RBC # BLD AUTO: 4.15 M/UL (ref 4.21–5.77)
SODIUM SERPL-SCNC: 143 MMOL/L (ref 135–144)
TROPONIN I SERPL HS-MCNC: 29 NG/L (ref 0–22)
TROPONIN I SERPL HS-MCNC: 30 NG/L (ref 0–22)
WBC OTHER # BLD: 4.3 K/UL (ref 3.5–11.3)

## 2024-02-11 PROCEDURE — 84484 ASSAY OF TROPONIN QUANT: CPT

## 2024-02-11 PROCEDURE — 99284 EMERGENCY DEPT VISIT MOD MDM: CPT

## 2024-02-11 PROCEDURE — 93005 ELECTROCARDIOGRAM TRACING: CPT | Performed by: EMERGENCY MEDICINE

## 2024-02-11 PROCEDURE — 80053 COMPREHEN METABOLIC PANEL: CPT

## 2024-02-11 PROCEDURE — 36415 COLL VENOUS BLD VENIPUNCTURE: CPT

## 2024-02-11 PROCEDURE — 83735 ASSAY OF MAGNESIUM: CPT

## 2024-02-11 PROCEDURE — 85025 COMPLETE CBC W/AUTO DIFF WBC: CPT

## 2024-02-11 PROCEDURE — 85379 FIBRIN DEGRADATION QUANT: CPT

## 2024-02-11 NOTE — ED PROVIDER NOTES
diaphoretic.   HENT:      Head: Normocephalic and atraumatic.      Right Ear: External ear normal.      Left Ear: External ear normal.   Eyes:      Pupils: Pupils are equal, round, and reactive to light.   Cardiovascular:      Rate and Rhythm: Normal rate and regular rhythm.   Pulmonary:      Effort: Pulmonary effort is normal.      Breath sounds: Normal breath sounds.   Abdominal:      General: Bowel sounds are normal.      Palpations: Abdomen is soft.   Musculoskeletal:         General: Normal range of motion.      Cervical back: Normal range of motion and neck supple.      Right lower leg: No edema.      Left lower leg: No edema.   Skin:     General: Skin is warm and dry.   Neurological:      Mental Status: He is alert and oriented to person, place, and time.   Psychiatric:         Behavior: Behavior normal.           DIFFERENTIAL DIAGNOSIS/ MDM:     Hypertension atypical chest pain that was evaluated last week for it will repeat enzymes do a D-dimer    DIAGNOSTIC RESULTS     EKG: All EKG's are interpreted by the Emergency Department Physician who either signs or Co-signs this chart in the absence of a cardiologist.  EKG 1 shows sinus rhythm with first-degree AV block rate of 76 bpm MT was 220 ms QRS durations 120 ms  axis is 71 there is no change from prior EKGs  Right bundle branch block seen on previous EKGs  Repeat EKG shows sinus rhythm with first-degree AV block 72 bpm parables 214 ms QRS durations 116 ms QT corrected 479 ms axis is 67 there is no acute ST or T wave changes  RADIOLOGY:   No orders to display      I directly visualized the following  images and reviewed the radiologist interpretations:          ED BEDSIDE ULTRASOUND:       LABS:  Labs Reviewed   CBC WITH AUTO DIFFERENTIAL - Abnormal; Notable for the following components:       Result Value    RBC 4.15 (*)     Hemoglobin 12.6 (*)     Hematocrit 37.6 (*)     All other components within normal limits   COMPREHENSIVE METABOLIC PANEL W/

## 2024-02-12 ENCOUNTER — OFFICE VISIT (OUTPATIENT)
Dept: FAMILY MEDICINE CLINIC | Age: 59
End: 2024-02-12
Payer: COMMERCIAL

## 2024-02-12 VITALS
BODY MASS INDEX: 28.21 KG/M2 | HEART RATE: 79 BPM | WEIGHT: 196.6 LBS | OXYGEN SATURATION: 98 % | DIASTOLIC BLOOD PRESSURE: 92 MMHG | SYSTOLIC BLOOD PRESSURE: 162 MMHG

## 2024-02-12 DIAGNOSIS — F31.78 BIPOLAR DISORDER, IN FULL REMISSION, MOST RECENT EPISODE MIXED (HCC): ICD-10-CM

## 2024-02-12 DIAGNOSIS — I10 ESSENTIAL HYPERTENSION: Primary | ICD-10-CM

## 2024-02-12 PROBLEM — F31.81 BIPOLAR II DISORDER (HCC): Status: ACTIVE | Noted: 2024-01-03

## 2024-02-12 PROBLEM — F06.32 MOOD DISORDER DUE TO KNOWN PHYSIOLOGICAL CONDITION WITH MAJOR DEPRESSIVE-LIKE EPISODE: Status: ACTIVE | Noted: 2024-01-03

## 2024-02-12 PROCEDURE — 99213 OFFICE O/P EST LOW 20 MIN: CPT | Performed by: FAMILY MEDICINE

## 2024-02-12 PROCEDURE — 3077F SYST BP >= 140 MM HG: CPT | Performed by: FAMILY MEDICINE

## 2024-02-12 PROCEDURE — 3080F DIAST BP >= 90 MM HG: CPT | Performed by: FAMILY MEDICINE

## 2024-02-12 RX ORDER — QUETIAPINE FUMARATE 50 MG/1
50 TABLET, FILM COATED ORAL 2 TIMES DAILY PRN
COMMUNITY
Start: 2024-02-05

## 2024-02-12 RX ORDER — METOPROLOL SUCCINATE 50 MG/1
50 TABLET, EXTENDED RELEASE ORAL DAILY
Qty: 30 TABLET | Refills: 3 | Status: SHIPPED | OUTPATIENT
Start: 2024-02-12 | End: 2024-02-16

## 2024-02-12 RX ORDER — BUSPIRONE HYDROCHLORIDE 10 MG/1
10 TABLET ORAL 3 TIMES DAILY
COMMUNITY
Start: 2024-01-03

## 2024-02-12 NOTE — PROGRESS NOTES
Increased bp since coming off lithium.  Compliant with hydralazine tid.          Plan:      Adding toprol xl at 50mg /day.  Hopefully help with some of the adrenergic symptoms he is having.  Call in one week with bp and pulse readings. Recheck one month.  Call with interval concerns.     Bipolar.  Some recurrent hypo manic issues off lithium  currently titrating medication.  Looking for new psychiatrist.              Ricky Clark MD

## 2024-02-12 NOTE — PATIENT INSTRUCTIONS
97%).  If this test is not being used to help rule out DVT and PE, then the following reference   range should be utilized: 0.00 - 0.59 ug/mL FEU.  The D-Dimer assay is intended for use as an aid in the diagnosis of venous thromboembolism   (DVT and PE) and the results should be interpreted in conjunction with the patient's medical   history, clinical presentation, and other findings.        Elevated levels of D-dimer activity can be seen in any state of coagulation activation and   is not recommended in patients with therapeutic dose anticoagulant therapy for >24 hours,   fibrinolytic therapy within the previous 7 days, trauma or surgery within the previous 4   weeks,   disseminated malignancies, aortic aneurysm, sepsis, severe infections, pneumonia, severe   skin infections, liver cirrhosis, advanced age, cor                           onary disease, diabetes, and pregnancy.  A very low percentage of patients with DVT may yield D-dimer results below the cutoff of 0.5   ug/mL FEU.  This is known to be more prevalent in patients with distal DVT.            Magnesium 02/11/2024 2.3  1.6 - 2.6 mg/dL Final    Troponin, High Sensitivity 02/11/2024 30 (H)  0 - 22 ng/L Final    High Sensitivity Troponin values cannot be compared with other Troponin methodologies.

## 2024-02-16 RX ORDER — METOPROLOL SUCCINATE 50 MG/1
75 TABLET, EXTENDED RELEASE ORAL DAILY
Qty: 45 TABLET | Refills: 3
Start: 2024-02-16

## 2024-03-12 ENCOUNTER — OFFICE VISIT (OUTPATIENT)
Dept: FAMILY MEDICINE CLINIC | Age: 59
End: 2024-03-12
Payer: COMMERCIAL

## 2024-03-12 VITALS
WEIGHT: 200.4 LBS | OXYGEN SATURATION: 96 % | BODY MASS INDEX: 28.69 KG/M2 | HEART RATE: 65 BPM | SYSTOLIC BLOOD PRESSURE: 136 MMHG | HEIGHT: 70 IN | DIASTOLIC BLOOD PRESSURE: 68 MMHG

## 2024-03-12 DIAGNOSIS — F31.78 BIPOLAR DISORDER, IN FULL REMISSION, MOST RECENT EPISODE MIXED (HCC): ICD-10-CM

## 2024-03-12 DIAGNOSIS — I10 ESSENTIAL HYPERTENSION: Primary | ICD-10-CM

## 2024-03-12 PROCEDURE — 3075F SYST BP GE 130 - 139MM HG: CPT | Performed by: FAMILY MEDICINE

## 2024-03-12 PROCEDURE — 3078F DIAST BP <80 MM HG: CPT | Performed by: FAMILY MEDICINE

## 2024-03-12 PROCEDURE — 99213 OFFICE O/P EST LOW 20 MIN: CPT | Performed by: FAMILY MEDICINE

## 2024-03-12 RX ORDER — METOPROLOL SUCCINATE 100 MG/1
100 TABLET, EXTENDED RELEASE ORAL DAILY
Qty: 30 TABLET | Refills: 5 | Status: SHIPPED | OUTPATIENT
Start: 2024-03-12

## 2024-03-12 ASSESSMENT — PATIENT HEALTH QUESTIONNAIRE - PHQ9
2. FEELING DOWN, DEPRESSED OR HOPELESS: 0
SUM OF ALL RESPONSES TO PHQ QUESTIONS 1-9: 0
9. THOUGHTS THAT YOU WOULD BE BETTER OFF DEAD, OR OF HURTING YOURSELF: 0
7. TROUBLE CONCENTRATING ON THINGS, SUCH AS READING THE NEWSPAPER OR WATCHING TELEVISION: 0
5. POOR APPETITE OR OVEREATING: 0
SUM OF ALL RESPONSES TO PHQ QUESTIONS 1-9: 0
SUM OF ALL RESPONSES TO PHQ QUESTIONS 1-9: 0
10. IF YOU CHECKED OFF ANY PROBLEMS, HOW DIFFICULT HAVE THESE PROBLEMS MADE IT FOR YOU TO DO YOUR WORK, TAKE CARE OF THINGS AT HOME, OR GET ALONG WITH OTHER PEOPLE: 0
6. FEELING BAD ABOUT YOURSELF - OR THAT YOU ARE A FAILURE OR HAVE LET YOURSELF OR YOUR FAMILY DOWN: 0
SUM OF ALL RESPONSES TO PHQ9 QUESTIONS 1 & 2: 0
3. TROUBLE FALLING OR STAYING ASLEEP: 0
1. LITTLE INTEREST OR PLEASURE IN DOING THINGS: 0
SUM OF ALL RESPONSES TO PHQ QUESTIONS 1-9: 0
8. MOVING OR SPEAKING SO SLOWLY THAT OTHER PEOPLE COULD HAVE NOTICED. OR THE OPPOSITE, BEING SO FIGETY OR RESTLESS THAT YOU HAVE BEEN MOVING AROUND A LOT MORE THAN USUAL: 0
4. FEELING TIRED OR HAVING LITTLE ENERGY: 0

## 2024-03-12 ASSESSMENT — ENCOUNTER SYMPTOMS
EYES NEGATIVE: 1
GASTROINTESTINAL NEGATIVE: 1
RESPIRATORY NEGATIVE: 1

## 2024-03-12 NOTE — PROGRESS NOTES
Subjective:      Patient ID: Patrice Lund is a 58 y.o. male.    HPI  scheduled follow up on htn.  Added toprol at 75mg/day.  Has tried 100mg on occasion.  These doses seem to help. Came of lithium due to renal concerns.   Having a lot of trouble sleeping.  Change in his psych meds led to recurrent issues with hypomania .  Bp elevations noted during this transition period, up to 180 systolic.  Still working on medications though psych.  Seroquel added more recently.  Shane diagnosis.  Not tolerating the mask.  Considering inspire implant.      Past Medical History:   Diagnosis Date    Bipolar disorder (HCC)     diagnosed 1986    Hypertension     SHANE on CPAP 2012     Past Surgical History:   Procedure Laterality Date    COLONOSCOPY  06/16/2017    normal, Dr Walsh at [facility]-10 yr follow up    CYST REMOVAL N/A 03/31/2017    scalp, done by dr. Clark    LYMPH NODE DISSECTION Right 2015    [place]    TONSILLECTOMY      VASECTOMY  12/15/2000     Current Outpatient Medications   Medication Sig Dispense Refill    metoprolol succinate (TOPROL XL) 50 MG extended release tablet Take 1.5 tablets by mouth daily 45 tablet 3    busPIRone (BUSPAR) 10 MG tablet Take 1 tablet by mouth 3 times daily      QUEtiapine (SEROQUEL) 50 MG tablet Take 1 tablet by mouth 2 times daily as needed      hydrALAZINE (APRESOLINE) 100 MG tablet Take 1 tablet by mouth 3 times daily      Multiple Vitamins-Minerals (MULTIVITAMIN PO) Take 1 tablet by mouth daily      Omega-3 Fatty Acids (FISH OIL PO) Take 2 capsules by mouth daily      lamoTRIgine (LAMICTAL) 200 MG tablet Take 1 tablet by mouth 2 times daily       No current facility-administered medications for this visit.         Review of Systems   Constitutional: Negative.    HENT: Negative.     Eyes: Negative.    Respiratory: Negative.     Cardiovascular: Negative.    Gastrointestinal: Negative.    Genitourinary: Negative.    Musculoskeletal: Negative.    Skin: Negative.    Neurological: Negative.

## 2024-03-14 ENCOUNTER — OFFICE VISIT (OUTPATIENT)
Dept: PRIMARY CARE CLINIC | Age: 59
End: 2024-03-14
Payer: COMMERCIAL

## 2024-03-14 VITALS
OXYGEN SATURATION: 96 % | DIASTOLIC BLOOD PRESSURE: 88 MMHG | HEART RATE: 59 BPM | TEMPERATURE: 96.8 F | WEIGHT: 205.2 LBS | BODY MASS INDEX: 29.44 KG/M2 | SYSTOLIC BLOOD PRESSURE: 128 MMHG

## 2024-03-14 DIAGNOSIS — J06.9 ACUTE UPPER RESPIRATORY INFECTION: Primary | ICD-10-CM

## 2024-03-14 PROCEDURE — 99213 OFFICE O/P EST LOW 20 MIN: CPT | Performed by: FAMILY MEDICINE

## 2024-03-14 PROCEDURE — 3079F DIAST BP 80-89 MM HG: CPT | Performed by: FAMILY MEDICINE

## 2024-03-14 PROCEDURE — 3074F SYST BP LT 130 MM HG: CPT | Performed by: FAMILY MEDICINE

## 2024-03-14 ASSESSMENT — ENCOUNTER SYMPTOMS
RESPIRATORY NEGATIVE: 1
SINUS PRESSURE: 1
SORE THROAT: 0
GASTROINTESTINAL NEGATIVE: 1
EYES NEGATIVE: 1
ALLERGIC/IMMUNOLOGIC NEGATIVE: 1
RHINORRHEA: 1
COUGH: 0

## 2024-03-14 NOTE — PROGRESS NOTES
Allergic/Immunologic: Negative.    Neurological: Negative.  Negative for headaches.   Hematological: Negative.    Psychiatric/Behavioral: Negative.         Objective:   Physical Exam  Constitutional:       General: He is not in acute distress.     Appearance: He is well-developed.   HENT:      Head: Normocephalic and atraumatic.      Right Ear: External ear normal.      Left Ear: External ear normal.      Nose: Congestion and rhinorrhea present.      Mouth/Throat:      Pharynx: No oropharyngeal exudate.   Eyes:      General: No scleral icterus.     Conjunctiva/sclera: Conjunctivae normal.   Neck:      Thyroid: No thyromegaly.   Cardiovascular:      Rate and Rhythm: Normal rate and regular rhythm.      Heart sounds: Normal heart sounds. No murmur heard.  Pulmonary:      Effort: Pulmonary effort is normal. No respiratory distress.      Breath sounds: Normal breath sounds. No wheezing.   Abdominal:      General: Bowel sounds are normal. There is no distension.      Palpations: Abdomen is soft.      Tenderness: There is no abdominal tenderness. There is no rebound.   Musculoskeletal:         General: No tenderness. Normal range of motion.      Cervical back: Neck supple.   Skin:     General: Skin is warm and dry.      Findings: No erythema or rash.   Neurological:      Mental Status: He is alert and oriented to person, place, and time.   Psychiatric:         Behavior: Behavior normal.         Thought Content: Thought content normal.         Judgment: Judgment normal.       /88   Pulse 59   Temp 96.8 °F (36 °C) (Tympanic)   Wt 93.1 kg (205 lb 3.2 oz)   SpO2 96%   BMI 29.44 kg/m²     Assessment:      Acute uri, early  Discussed typical course, supportive care, and complications        Plan:      Afrin and flonase , increased humidity, cough suppression.    Follow up prn concerns for complications.         Ricky Clark MD

## 2024-03-19 ENCOUNTER — OFFICE VISIT (OUTPATIENT)
Dept: NEPHROLOGY | Age: 59
End: 2024-03-19
Payer: COMMERCIAL

## 2024-03-19 VITALS
HEIGHT: 70 IN | WEIGHT: 199 LBS | HEART RATE: 68 BPM | DIASTOLIC BLOOD PRESSURE: 82 MMHG | BODY MASS INDEX: 28.49 KG/M2 | SYSTOLIC BLOOD PRESSURE: 134 MMHG

## 2024-03-19 DIAGNOSIS — N14.2 CHRONIC DRUG-INDUCED TUBULOINTERSTITIAL NEPHRITIS: ICD-10-CM

## 2024-03-19 DIAGNOSIS — I10 PRIMARY HYPERTENSION: ICD-10-CM

## 2024-03-19 DIAGNOSIS — N18.31 STAGE 3A CHRONIC KIDNEY DISEASE (HCC): Primary | ICD-10-CM

## 2024-03-19 PROCEDURE — 3075F SYST BP GE 130 - 139MM HG: CPT | Performed by: INTERNAL MEDICINE

## 2024-03-19 PROCEDURE — 99204 OFFICE O/P NEW MOD 45 MIN: CPT | Performed by: INTERNAL MEDICINE

## 2024-03-19 PROCEDURE — 3079F DIAST BP 80-89 MM HG: CPT | Performed by: INTERNAL MEDICINE

## 2024-03-19 NOTE — PROGRESS NOTES
Renal Consult Note    Patient :  Patrice Lund; 58 y.o. MRN# [id number]    Reason for Consult:     Asked by Dr Cheema att. providers found to see for ALEA/Elevated Creatinine.    History of Present Illness:     Patrice Lund; 58 y.o. male with past medical history as mentioned below.     On lithium x 37 years, switched to other medications by mental health professional at [facility]. Also, ER visit and [facility] visit for uncontrolled hypertension.    Current medications include multivitamin, BuSpar, hydralazine 100 mg 3 times daily, fish oil, Lamictal and Seroquel..    History of bipolar disorder diagnosed in 1986, primary hypertension, obstructive sleep apnea on CPAP.    The patient is .  He has 3 children.  Occasional alcohol use.  No smoking cigarettes or vaping ever.    Family history of father with obesity, diabetes mellitus, hypertension and atrial fibrillation and kidney disease. Mother with history of breast cancer and anxiety.    No history of recent contrast exposure, No h/o prolonged NSAIDs use in the past, No h/o nephrolithiasis, No recent skin rashes or arthralgias, No hematuria or pyuria noticed in the recent past. Doesn't report any reduction in the urine output recently. No report of any obstructive urinary symptoms (urgency, frequency, weak stream, straining while urination). No h/o recurrent UTIs in the past.  Laboratory going back to 2021 shows a creatinine in the range of 1.2-1.9, although generally in the range of 1.2-1.4. last estimated GFR was 59 mL/m from 2/11/2024. On that same date the patient had a hemoglobin of 12.6 with white blood cells 4.3 and platelets 213.    Past History/Allergies?Social History:     Past Medical History:   Diagnosis Date    Bipolar disorder (HCC)     diagnosed 1986    Hypertension     DENISE on CPAP 2012       No Known Allergies    Social History     Socioeconomic History    Marital status:      Spouse name: Mila    Number of children: 3

## 2024-04-02 ENCOUNTER — OFFICE VISIT (OUTPATIENT)
Dept: PRIMARY CARE CLINIC | Age: 59
End: 2024-04-02
Payer: COMMERCIAL

## 2024-04-02 ENCOUNTER — HOSPITAL ENCOUNTER (OUTPATIENT)
Dept: ULTRASOUND IMAGING | Age: 59
Discharge: HOME | End: 2024-04-04
Attending: INTERNAL MEDICINE
Payer: COMMERCIAL

## 2024-04-02 VITALS
DIASTOLIC BLOOD PRESSURE: 82 MMHG | TEMPERATURE: 98.2 F | OXYGEN SATURATION: 98 % | BODY MASS INDEX: 29.13 KG/M2 | HEART RATE: 74 BPM | WEIGHT: 203 LBS | SYSTOLIC BLOOD PRESSURE: 120 MMHG

## 2024-04-02 DIAGNOSIS — N14.2 CHRONIC DRUG-INDUCED TUBULOINTERSTITIAL NEPHRITIS: ICD-10-CM

## 2024-04-02 DIAGNOSIS — I10 PRIMARY HYPERTENSION: ICD-10-CM

## 2024-04-02 DIAGNOSIS — N18.31 STAGE 3A CHRONIC KIDNEY DISEASE (HCC): ICD-10-CM

## 2024-04-02 DIAGNOSIS — J01.00 ACUTE NON-RECURRENT MAXILLARY SINUSITIS: Primary | ICD-10-CM

## 2024-04-02 PROCEDURE — 76770 US EXAM ABDO BACK WALL COMP: CPT

## 2024-04-02 PROCEDURE — 3079F DIAST BP 80-89 MM HG: CPT | Performed by: NURSE PRACTITIONER

## 2024-04-02 PROCEDURE — 3074F SYST BP LT 130 MM HG: CPT | Performed by: NURSE PRACTITIONER

## 2024-04-02 PROCEDURE — 99213 OFFICE O/P EST LOW 20 MIN: CPT | Performed by: NURSE PRACTITIONER

## 2024-04-02 RX ORDER — PREDNISONE 20 MG/1
20 TABLET ORAL 2 TIMES DAILY
Qty: 10 TABLET | Refills: 0 | Status: SHIPPED | OUTPATIENT
Start: 2024-04-02 | End: 2024-04-07

## 2024-04-02 RX ORDER — AZITHROMYCIN 250 MG/1
TABLET, FILM COATED ORAL
Qty: 1 PACKET | Refills: 0 | Status: SHIPPED | OUTPATIENT
Start: 2024-04-02 | End: 2024-04-07

## 2024-04-02 ASSESSMENT — ENCOUNTER SYMPTOMS
RHINORRHEA: 1
SHORTNESS OF BREATH: 0
SINUS COMPLAINT: 1
SORE THROAT: 1
WHEEZING: 0
SINUS PRESSURE: 1
COUGH: 1

## 2024-04-02 ASSESSMENT — PATIENT HEALTH QUESTIONNAIRE - PHQ9
SUM OF ALL RESPONSES TO PHQ QUESTIONS 1-9: 0
SUM OF ALL RESPONSES TO PHQ QUESTIONS 1-9: 0
2. FEELING DOWN, DEPRESSED OR HOPELESS: NOT AT ALL
SUM OF ALL RESPONSES TO PHQ QUESTIONS 1-9: 0
1. LITTLE INTEREST OR PLEASURE IN DOING THINGS: NOT AT ALL
SUM OF ALL RESPONSES TO PHQ QUESTIONS 1-9: 0
SUM OF ALL RESPONSES TO PHQ9 QUESTIONS 1 & 2: 0

## 2024-04-02 NOTE — PROGRESS NOTES
[facility]  [address] [place], [id number]  [phone]      HPI:     Sinus Problem  This is a new problem. The current episode started 1 to 4 weeks ago. The problem is unchanged. There has been no fever. Associated symptoms include congestion, coughing, headaches, sinus pressure and a sore throat. Pertinent negatives include no shortness of breath. Past treatments include nasal decongestants and saline nose sprays. The treatment provided no relief.       Current Outpatient Medications   Medication Sig Dispense Refill    azithromycin (ZITHROMAX Z-ANA LUISA) 250 MG tablet Two pills daily first day, then one pill daily for 4 days. Take with food. 1 packet 0    predniSONE (DELTASONE) 20 MG tablet Take 1 tablet by mouth 2 times daily for 5 days 10 tablet 0    metoprolol succinate (TOPROL XL) 100 MG extended release tablet Take 1 tablet by mouth daily 30 tablet 5    busPIRone (BUSPAR) 10 MG tablet Take 1 tablet by mouth 3 times daily      QUEtiapine (SEROQUEL) 50 MG tablet Take 1 tablet by mouth 2 times daily as needed      hydrALAZINE (APRESOLINE) 100 MG tablet Take 1 tablet by mouth 3 times daily      Multiple Vitamins-Minerals (MULTIVITAMIN PO) Take 1 tablet by mouth daily      Omega-3 Fatty Acids (FISH OIL PO) Take 2 capsules by mouth daily      lamoTRIgine (LAMICTAL) 200 MG tablet Take 1 tablet by mouth 2 times daily       No current facility-administered medications for this visit.     No Known Allergies    All patients pastmedical, surgical, social and family history has been reviewed.    Subjective:      Review of Systems   Constitutional:  Positive for fatigue. Negative for activity change, appetite change and fever.   HENT:  Positive for congestion, postnasal drip, rhinorrhea, sinus pressure and sore throat.    Respiratory:  Positive for cough. Negative for shortness of breath and wheezing.    Cardiovascular:  Negative for chest pain and palpitations.   Neurological:  Positive

## 2024-04-06 ENCOUNTER — HOSPITAL ENCOUNTER (OUTPATIENT)
Age: 59
Discharge: HOME | End: 2024-04-06
Payer: COMMERCIAL

## 2024-04-06 DIAGNOSIS — I10 ESSENTIAL HYPERTENSION: ICD-10-CM

## 2024-04-06 DIAGNOSIS — R73.01 IMPAIRED FASTING BLOOD SUGAR: ICD-10-CM

## 2024-04-06 LAB
ANION GAP SERPL CALCULATED.3IONS-SCNC: 9 MMOL/L (ref 9–17)
BASOPHILS # BLD: 0.03 K/UL (ref 0–0.2)
BASOPHILS NFR BLD: 1 % (ref 0–2)
BUN SERPL-MCNC: 29 MG/DL (ref 6–20)
BUN/CREAT SERPL: 19 (ref 9–20)
CALCIUM SERPL-MCNC: 10.2 MG/DL (ref 8.6–10.4)
CHLORIDE SERPL-SCNC: 107 MMOL/L (ref 98–107)
CO2 SERPL-SCNC: 24 MMOL/L (ref 20–31)
CREAT SERPL-MCNC: 1.5 MG/DL (ref 0.7–1.2)
EOSINOPHIL # BLD: 0.07 K/UL (ref 0–0.44)
EOSINOPHILS RELATIVE PERCENT: 1 % (ref 1–4)
ERYTHROCYTE [DISTWIDTH] IN BLOOD BY AUTOMATED COUNT: 12.9 % (ref 11.8–14.4)
EST. AVERAGE GLUCOSE BLD GHB EST-MCNC: 97 MG/DL
GFR SERPL CREATININE-BSD FRML MDRD: 54 ML/MIN/1.73M2
GLUCOSE SERPL-MCNC: 117 MG/DL (ref 70–99)
HBA1C MFR BLD: 5 % (ref 4–6)
HCT VFR BLD AUTO: 37.1 % (ref 40.7–50.3)
HGB BLD-MCNC: 12.5 G/DL (ref 13–17)
IMM GRANULOCYTES # BLD AUTO: <0.03 K/UL (ref 0–0.3)
IMM GRANULOCYTES NFR BLD: 0 %
LYMPHOCYTES NFR BLD: 1.09 K/UL (ref 1.1–3.7)
LYMPHOCYTES RELATIVE PERCENT: 20 % (ref 24–43)
MCH RBC QN AUTO: 29.8 PG (ref 25.2–33.5)
MCHC RBC AUTO-ENTMCNC: 33.7 G/DL (ref 25.2–33.5)
MCV RBC AUTO: 88.5 FL (ref 82.6–102.9)
MONOCYTES NFR BLD: 0.27 K/UL (ref 0.1–1.2)
MONOCYTES NFR BLD: 5 % (ref 3–12)
NEUTROPHILS NFR BLD: 73 % (ref 36–65)
NEUTS SEG NFR BLD: 4.11 K/UL (ref 1.5–8.1)
NRBC BLD-RTO: 0 PER 100 WBC
PLATELET # BLD AUTO: 274 K/UL (ref 138–453)
PMV BLD AUTO: 9.3 FL (ref 8.1–13.5)
POTASSIUM SERPL-SCNC: 3.7 MMOL/L (ref 3.7–5.3)
RBC # BLD AUTO: 4.19 M/UL (ref 4.21–5.77)
SODIUM SERPL-SCNC: 140 MMOL/L (ref 135–144)
WBC OTHER # BLD: 5.6 K/UL (ref 3.5–11.3)

## 2024-04-06 PROCEDURE — 83036 HEMOGLOBIN GLYCOSYLATED A1C: CPT

## 2024-04-06 PROCEDURE — 80048 BASIC METABOLIC PNL TOTAL CA: CPT

## 2024-04-06 PROCEDURE — 85025 COMPLETE CBC W/AUTO DIFF WBC: CPT

## 2024-04-06 PROCEDURE — 36415 COLL VENOUS BLD VENIPUNCTURE: CPT

## 2024-04-15 ENCOUNTER — OFFICE VISIT (OUTPATIENT)
Dept: FAMILY MEDICINE CLINIC | Age: 59
End: 2024-04-15
Payer: COMMERCIAL

## 2024-04-15 VITALS
SYSTOLIC BLOOD PRESSURE: 130 MMHG | DIASTOLIC BLOOD PRESSURE: 82 MMHG | WEIGHT: 202.2 LBS | BODY MASS INDEX: 29.01 KG/M2 | OXYGEN SATURATION: 97 % | HEART RATE: 60 BPM

## 2024-04-15 DIAGNOSIS — I10 ESSENTIAL HYPERTENSION: Primary | ICD-10-CM

## 2024-04-15 DIAGNOSIS — N18.31 STAGE 3A CHRONIC KIDNEY DISEASE (HCC): ICD-10-CM

## 2024-04-15 DIAGNOSIS — Z12.5 SCREENING PSA (PROSTATE SPECIFIC ANTIGEN): ICD-10-CM

## 2024-04-15 DIAGNOSIS — F31.78 BIPOLAR DISORDER, IN FULL REMISSION, MOST RECENT EPISODE MIXED (HCC): ICD-10-CM

## 2024-04-15 DIAGNOSIS — G47.33 OSA ON CPAP: ICD-10-CM

## 2024-04-15 DIAGNOSIS — M25.562 CHRONIC PAIN OF LEFT KNEE: ICD-10-CM

## 2024-04-15 DIAGNOSIS — R73.01 IMPAIRED FASTING BLOOD SUGAR: ICD-10-CM

## 2024-04-15 DIAGNOSIS — G89.29 CHRONIC PAIN OF LEFT KNEE: ICD-10-CM

## 2024-04-15 PROCEDURE — 99214 OFFICE O/P EST MOD 30 MIN: CPT | Performed by: FAMILY MEDICINE

## 2024-04-15 PROCEDURE — 3079F DIAST BP 80-89 MM HG: CPT | Performed by: FAMILY MEDICINE

## 2024-04-15 PROCEDURE — 3075F SYST BP GE 130 - 139MM HG: CPT | Performed by: FAMILY MEDICINE

## 2024-04-15 RX ORDER — METOPROLOL SUCCINATE 50 MG/1
50 TABLET, EXTENDED RELEASE ORAL DAILY
COMMUNITY
Start: 2024-03-28

## 2024-04-15 SDOH — ECONOMIC STABILITY: FOOD INSECURITY: WITHIN THE PAST 12 MONTHS, YOU WORRIED THAT YOUR FOOD WOULD RUN OUT BEFORE YOU GOT MONEY TO BUY MORE.: NEVER TRUE

## 2024-04-15 SDOH — ECONOMIC STABILITY: HOUSING INSECURITY
IN THE LAST 12 MONTHS, WAS THERE A TIME WHEN YOU DID NOT HAVE A STEADY PLACE TO SLEEP OR SLEPT IN A SHELTER (INCLUDING NOW)?: NO

## 2024-04-15 SDOH — ECONOMIC STABILITY: FOOD INSECURITY: WITHIN THE PAST 12 MONTHS, THE FOOD YOU BOUGHT JUST DIDN'T LAST AND YOU DIDN'T HAVE MONEY TO GET MORE.: NEVER TRUE

## 2024-04-15 SDOH — ECONOMIC STABILITY: INCOME INSECURITY: HOW HARD IS IT FOR YOU TO PAY FOR THE VERY BASICS LIKE FOOD, HOUSING, MEDICAL CARE, AND HEATING?: NOT HARD AT ALL

## 2024-04-15 ASSESSMENT — ENCOUNTER SYMPTOMS
ALLERGIC/IMMUNOLOGIC NEGATIVE: 1
RESPIRATORY NEGATIVE: 1
BACK PAIN: 1
GASTROINTESTINAL NEGATIVE: 1
EYES NEGATIVE: 1

## 2024-04-15 NOTE — PROGRESS NOTES
100 mg tid.  Bp back to normal range now.  Cont. Current dosing.      Bipolar disorder: Cont. Current med[place] through [place] [place] [facility] [place]aned off lithium due to long term use and declining renal function.  Went through several medication trials to find a combination that seems to be helping sleep/insomnia/and mood.  Cont. Lamictal, seroquel and buspar.  Feels he is stable and back to baseline at present.      Shane.  Compliant, perceived benefit there, Cont. cpap nightly    Colonoscopy normal 6/16/17- 10 yr follow up.      Mild ifbs at 117, .  Dad and gma with diabetes.    Discussed dietary changes and some wt. Loss.    a1c 5%--4.1%, stable No progression.      Ckd?  Stage 3 GFR 53 and 54, creatinine 1.39 and 1.38 on labs 1/30/21 and 3/13/21 respectively. Currently 1.42-->1.9. Concerns for lithium/cozaar use.  Electrolytes normal.  .  Lithium and cozaar stopped.  Creatinine improved to 1.5 at present.  Cont. Serial follow     Chronic left knee pain and altered gait.  Xray unrevealing.  Mechanical issue described.  Knee does not want to extend fully, altering his gait.  Right knee sore at times due to compensating for altered gait.  Given chronicity and mechanical nature, referring to ortho for consult.  Saw Dr. Baig, referred to PT . He is doing better with PT.  Improved at present.  Discomfort comes and goes.  Some interval right knee discomfort off and on over this interval.  About the same.

## 2024-04-15 NOTE — PATIENT INSTRUCTIONS
Hospital Outpatient Visit on 04/06/2024   Component Date Value Ref Range Status    Sodium 04/06/2024 140  135 - 144 mmol/L Final    Potassium 04/06/2024 3.7  3.7 - 5.3 mmol/L Final    Chloride 04/06/2024 107  98 - 107 mmol/L Final    CO2 04/06/2024 24  20 - 31 mmol/L Final    Anion Gap 04/06/2024 9  9 - 17 mmol/L Final    Glucose 04/06/2024 117 (H)  70 - 99 mg/dL Final    BUN 04/06/2024 29 (H)  6 - 20 mg/dL Final    Creatinine 04/06/2024 1.5 (H)  0.7 - 1.2 mg/dL Final    Est, Glom Filt Rate 04/06/2024 54 (L)  >60 mL/min/1.73m2 Final    Comment:       These results are not intended for use in patients <18 years of age.        eGFR results are calculated without a race factor using the 2021 CKD-EPI equation.  Careful clinical correlation is recommended, particularly when comparing to results   calculated using previous equations.  The CKD-EPI equation is less accurate in patients with extremes of muscle mass, extra-renal   metabolism of creatine, excessive creatine ingestion, or following therapy that affects   renal tubular secretion.      Bun/Cre Ratio 04/06/2024 19  9 - 20 Final    Calcium 04/06/2024 10.2  8.6 - 10.4 mg/dL Final    WBC 04/06/2024 5.6  3.5 - 11.3 k/uL Final    RBC 04/06/2024 4.19 (L)  4.21 - 5.77 m/uL Final    Hemoglobin 04/06/2024 12.5 (L)  13.0 - 17.0 g/dL Final    Hematocrit 04/06/2024 37.1 (L)  40.7 - 50.3 % Final    MCV 04/06/2024 88.5  82.6 - 102.9 fL Final    MCH 04/06/2024 29.8  25.2 - 33.5 pg Final    MCHC 04/06/2024 33.7 (H)  25.2 - 33.5 g/dL Final    RDW 04/06/2024 12.9  11.8 - 14.4 % Final    Platelets 04/06/2024 274  138 - 453 k/uL Final    MPV 04/06/2024 9.3  8.1 - 13.5 fL Final    NRBC Automated 04/06/2024 0.0  0.0 per 100 WBC Final    Neutrophils % 04/06/2024 73 (H)  36 - 65 % Final    Lymphocytes % 04/06/2024 20 (L)  24 - 43 % Final    Monocytes % 04/06/2024 5  3 - 12 % Final    Eosinophils % 04/06/2024 1  1 - 4 % Final    Basophils % 04/06/2024 1  0 - 2 % Final    Immature

## 2024-05-09 RX ORDER — METOPROLOL SUCCINATE 100 MG/1
100 TABLET, EXTENDED RELEASE ORAL DAILY
Qty: 90 TABLET | Refills: 3 | Status: SHIPPED | OUTPATIENT
Start: 2024-05-09

## 2024-05-09 RX ORDER — METOPROLOL SUCCINATE 50 MG/1
TABLET, EXTENDED RELEASE ORAL
Refills: 0 | OUTPATIENT
Start: 2024-05-09

## 2024-05-09 NOTE — TELEPHONE ENCOUNTER
Patrice called requesting a refill of the below medication which has been pended for you:     Requested Prescriptions     Pending Prescriptions Disp Refills    metoprolol succinate (TOPROL XL) 100 MG extended release tablet [Pharmacy Med Name: METOPROLOL SUCCINATE ER TABS 100MG] 90 tablet 3     Sig: Take 1 tablet by mouth daily       Last Appointment Date: 4/15/2024  Next Appointment Date: 10/15/2024    No Known Allergies

## 2024-05-25 ENCOUNTER — HOSPITAL ENCOUNTER (OUTPATIENT)
Age: 59
Discharge: HOME | End: 2024-05-25
Payer: COMMERCIAL

## 2024-05-25 DIAGNOSIS — I10 PRIMARY HYPERTENSION: ICD-10-CM

## 2024-05-25 DIAGNOSIS — N14.2 CHRONIC DRUG-INDUCED TUBULOINTERSTITIAL NEPHRITIS: ICD-10-CM

## 2024-05-25 DIAGNOSIS — N18.31 STAGE 3A CHRONIC KIDNEY DISEASE (HCC): ICD-10-CM

## 2024-05-25 LAB
ALBUMIN SERPL-MCNC: 4.8 G/DL (ref 3.5–5.2)
ALBUMIN/GLOB SERPL: 2.3 {RATIO} (ref 1–2.5)
ALP SERPL-CCNC: 56 U/L (ref 40–129)
ALT SERPL-CCNC: 33 U/L (ref 5–41)
ANION GAP SERPL CALCULATED.3IONS-SCNC: 10 MMOL/L (ref 9–17)
AST SERPL-CCNC: 32 U/L
BILIRUB DIRECT SERPL-MCNC: 0.2 MG/DL
BILIRUB INDIRECT SERPL-MCNC: 0.4 MG/DL (ref 0–1)
BILIRUB SERPL-MCNC: 0.6 MG/DL (ref 0.3–1.2)
BILIRUB UR QL STRIP: NEGATIVE
BUN SERPL-MCNC: 21 MG/DL (ref 6–20)
C3 SERPL-MCNC: 74 MG/DL (ref 90–180)
C4 SERPL-MCNC: 26 MG/DL (ref 10–40)
CALCIUM SERPL-MCNC: 10.5 MG/DL (ref 8.6–10.4)
CHLORIDE SERPL-SCNC: 105 MMOL/L (ref 98–107)
CLARITY UR: CLEAR
CO2 SERPL-SCNC: 26 MMOL/L (ref 20–31)
COLOR UR: YELLOW
COMMENT: ABNORMAL
CREAT SERPL-MCNC: 1.7 MG/DL (ref 0.7–1.2)
CREAT UR-MCNC: 93.3 MG/DL (ref 39–259)
FREE KAPPA/LAMBDA RATIO: 1.78 (ref 0.22–1.74)
GFR, ESTIMATED: 46 ML/MIN/1.73M2
GLUCOSE SERPL-MCNC: 114 MG/DL (ref 70–99)
GLUCOSE UR STRIP-MCNC: NEGATIVE MG/DL
HGB UR QL STRIP.AUTO: NEGATIVE
KAPPA LC FREE SER-MCNC: 21.2 MG/L
KETONES UR STRIP-MCNC: NEGATIVE MG/DL
LAMBDA LC FREE SERPL-MCNC: 11.9 MG/L (ref 4.2–27.7)
LEUKOCYTE ESTERASE UR QL STRIP: NEGATIVE
NITRITE UR QL STRIP: NEGATIVE
PH UR STRIP: 6.5 [PH] (ref 5–6)
POTASSIUM SERPL-SCNC: 3.6 MMOL/L (ref 3.7–5.3)
PROT SERPL-MCNC: 6.9 G/DL (ref 6.4–8.3)
PROT UR STRIP-MCNC: NEGATIVE MG/DL
PTH-INTACT SERPL-MCNC: 39 PG/ML (ref 15–65)
SODIUM SERPL-SCNC: 141 MMOL/L (ref 135–144)
SP GR UR STRIP: 1.01 (ref 1.01–1.02)
TOTAL PROTEIN, URINE: 16 MG/DL
URATE SERPL-MCNC: 5.9 MG/DL (ref 3.4–7)
UROBILINOGEN UR STRIP-ACNC: NORMAL EU/DL (ref 0–1)

## 2024-05-25 PROCEDURE — 86038 ANTINUCLEAR ANTIBODIES: CPT

## 2024-05-25 PROCEDURE — 82248 BILIRUBIN DIRECT: CPT

## 2024-05-25 PROCEDURE — 83521 IG LIGHT CHAINS FREE EACH: CPT

## 2024-05-25 PROCEDURE — 82570 ASSAY OF URINE CREATININE: CPT

## 2024-05-25 PROCEDURE — 86160 COMPLEMENT ANTIGEN: CPT

## 2024-05-25 PROCEDURE — 81003 URINALYSIS AUTO W/O SCOPE: CPT

## 2024-05-25 PROCEDURE — 36415 COLL VENOUS BLD VENIPUNCTURE: CPT

## 2024-05-25 PROCEDURE — 86334 IMMUNOFIX E-PHORESIS SERUM: CPT

## 2024-05-25 PROCEDURE — 84550 ASSAY OF BLOOD/URIC ACID: CPT

## 2024-05-25 PROCEDURE — 84156 ASSAY OF PROTEIN URINE: CPT

## 2024-05-25 PROCEDURE — 80053 COMPREHEN METABOLIC PANEL: CPT

## 2024-05-25 PROCEDURE — 83970 ASSAY OF PARATHORMONE: CPT

## 2024-05-25 PROCEDURE — 86225 DNA ANTIBODY NATIVE: CPT

## 2024-05-25 PROCEDURE — 83516 IMMUNOASSAY NONANTIBODY: CPT

## 2024-05-29 LAB
ANA SER QL IA: NEGATIVE
ANCA MYELOPEROXIDASE: <0.3 AU/ML (ref 0–3.5)
ANCA PROTEINASE 3: <0.7 AU/ML (ref 0–2)
DSDNA IGG SER QL IA: 5.7 IU/ML
NUCLEAR IGG SER IA-RTO: 0.2 U/ML

## 2024-05-31 LAB
ITYP INTERPRETATION: NORMAL
PATH REV: NORMAL

## 2024-06-05 ENCOUNTER — OFFICE VISIT (OUTPATIENT)
Dept: FAMILY MEDICINE CLINIC | Age: 59
End: 2024-06-05
Payer: COMMERCIAL

## 2024-06-05 VITALS
DIASTOLIC BLOOD PRESSURE: 82 MMHG | WEIGHT: 203.4 LBS | BODY MASS INDEX: 29.18 KG/M2 | SYSTOLIC BLOOD PRESSURE: 138 MMHG | OXYGEN SATURATION: 97 % | HEART RATE: 50 BPM

## 2024-06-05 DIAGNOSIS — F31.78 BIPOLAR DISORDER, IN FULL REMISSION, MOST RECENT EPISODE MIXED (HCC): Primary | ICD-10-CM

## 2024-06-05 PROBLEM — F31.2 SEVERE MANIC BIPOLAR 1 DISORDER WITH PSYCHOTIC BEHAVIOR (HCC): Chronic | Status: ACTIVE | Noted: 2024-05-30

## 2024-06-05 PROBLEM — Z76.89 ENCOUNTER FOR PSYCHIATRIC ASSESSMENT: Status: ACTIVE | Noted: 2023-12-19

## 2024-06-05 PROBLEM — Z76.0: Status: ACTIVE | Noted: 2023-12-19

## 2024-06-05 PROBLEM — F99 PSYCHIATRIC PROBLEM: Status: ACTIVE | Noted: 2024-05-25

## 2024-06-05 PROBLEM — R45.851 SUICIDAL IDEATIONS: Status: ACTIVE | Noted: 2023-12-19

## 2024-06-05 PROBLEM — F30.9 MANIA (HCC): Status: ACTIVE | Noted: 2024-05-25

## 2024-06-05 PROBLEM — F31.2 BIPOLAR DISORDER, CURRENT EPISODE MANIC SEVERE WITH PSYCHOTIC FEATURES (HCC): Status: ACTIVE | Noted: 2024-03-06

## 2024-06-05 PROCEDURE — 3079F DIAST BP 80-89 MM HG: CPT | Performed by: FAMILY MEDICINE

## 2024-06-05 PROCEDURE — 99214 OFFICE O/P EST MOD 30 MIN: CPT | Performed by: FAMILY MEDICINE

## 2024-06-05 PROCEDURE — 3075F SYST BP GE 130 - 139MM HG: CPT | Performed by: FAMILY MEDICINE

## 2024-06-05 RX ORDER — METOPROLOL TARTRATE 75 MG/1
1 TABLET, FILM COATED ORAL EVERY MORNING
COMMUNITY
Start: 2024-05-31

## 2024-06-05 RX ORDER — FLUOXETINE HYDROCHLORIDE 20 MG/1
20 CAPSULE ORAL NIGHTLY
COMMUNITY
Start: 2024-05-30

## 2024-06-05 RX ORDER — OLANZAPINE 5 MG/1
5 TABLET, ORALLY DISINTEGRATING ORAL NIGHTLY
COMMUNITY
Start: 2024-05-30

## 2024-06-05 RX ORDER — LUMATEPERONE 42 MG/1
42 CAPSULE ORAL EVERY MORNING
COMMUNITY
Start: 2024-05-20 | End: 2024-06-05

## 2024-06-05 ASSESSMENT — ENCOUNTER SYMPTOMS
EYES NEGATIVE: 1
RESPIRATORY NEGATIVE: 1
GASTROINTESTINAL NEGATIVE: 1
ALLERGIC/IMMUNOLOGIC NEGATIVE: 1

## 2024-06-05 NOTE — PROGRESS NOTES
Patrice Lund (:  1965) is a 58 y.o. male,Established patient, here for evaluation of the following chief complaint(s):  Follow-up (Discharge from [facility] on 2024. Patient was admitted through [facility]. Patient had a medication change and has been set up with a psychiatrist. )      Assessment & Plan   Encounter Diagnosis   Name Primary?    Bipolar disorder, in full remission, most recent episode mixed (HCC) Yes     Manic episode .  Medications changes as below. Improved at present.  Still with some racing thoughts and restlessness at home, staying busy with house and property.  Still following with psychiatry.  Discussing work return, planning 24.  Still watching closely for recurrent cordell after discharge and medication changes.  Spouse with him today.  Discussed early interventions for recurrent manic issues, depression, lack of sleep or other concerns.         Subjective   HPI  scheduled follow up on recent [facility] admission through 6 days ending 24 for mental health concerns.  Mind racing , not sleeping well, constantly thinking and doing things.  [place] stimulated, manic episode described.  Started having cognition changes.  Confusion, needing re direction.  Odd habit of counting backwards, delusional , historical information /quoting history.  Was on seroquel, then caplyta, now on zyprexa and prozac.  Remains on lamictal at lowered dose.  Doing well at present.  Still has racing thoughts.  Reporting regular/consistent compliance with medications.          Past Medical History:   Diagnosis Date    Bipolar disorder (HCC)     diagnosed     Hypertension     DENISE on CPAP      Past Surgical History:   Procedure Laterality Date    COLONOSCOPY  2017    normal, Dr Walsh at [facility]-10 yr follow up    CYST REMOVAL N/A 2017    scalp, done by dr. Clark    LYMPH NODE DISSECTION Right 2015    [facility]    TONSILLECTOMY      VASECTOMY  12/15/2000     Current

## 2024-06-19 ENCOUNTER — OFFICE VISIT (OUTPATIENT)
Dept: NEPHROLOGY | Age: 59
End: 2024-06-19
Payer: COMMERCIAL

## 2024-06-19 VITALS
BODY MASS INDEX: 30.06 KG/M2 | WEIGHT: 210 LBS | HEART RATE: 68 BPM | DIASTOLIC BLOOD PRESSURE: 70 MMHG | HEIGHT: 70 IN | SYSTOLIC BLOOD PRESSURE: 136 MMHG

## 2024-06-19 DIAGNOSIS — I10 PRIMARY HYPERTENSION: ICD-10-CM

## 2024-06-19 DIAGNOSIS — N14.2 CHRONIC DRUG-INDUCED TUBULOINTERSTITIAL NEPHRITIS: ICD-10-CM

## 2024-06-19 DIAGNOSIS — N18.31 STAGE 3A CHRONIC KIDNEY DISEASE (HCC): Primary | ICD-10-CM

## 2024-06-19 PROCEDURE — 99214 OFFICE O/P EST MOD 30 MIN: CPT | Performed by: INTERNAL MEDICINE

## 2024-06-19 PROCEDURE — 3075F SYST BP GE 130 - 139MM HG: CPT | Performed by: INTERNAL MEDICINE

## 2024-06-19 PROCEDURE — 3078F DIAST BP <80 MM HG: CPT | Performed by: INTERNAL MEDICINE

## 2024-06-19 NOTE — PROGRESS NOTES
renal disease, kidneys were symmetrical  7.  Elevated free serum light chain ratio but normal serum protein immuno typing which is the confirmatory test  8.  Mildly low C3 but normal urinalysis and no significant proteinuria so not a significant finding on this patient    Plan:   1.  It would be reasonable to put the patient back on lithium secondary to relatively mild chronic kidney disease and recent psychotic episode off the medication.  I am clearing the patient to be back on lithium.  2.  Return to see me in 4 months in the office  3.  Labs to be done by Dr. Clark prior to his visit with me       Thank you for allowing me to see this very pleasant patient in nephrology consultation.    Manan Davis M.D.  [facility]  6/19/2024

## 2024-07-02 LAB
BASOPHILS ABSOLUTE: 0.07 /ΜL
BASOPHILS RELATIVE PERCENT: 1.2 %
BILIRUBIN, URINE: NORMAL
BLOOD, URINE: NORMAL
BUN BLDV-MCNC: 41 MG/DL
CALCIUM SERPL-MCNC: 10.7 MG/DL
CHLORIDE BLD-SCNC: 107 MMOL/L
CLARITY, UA: NORMAL
CO2: 26 MMOL/L
COLOR, UA: NORMAL
CREAT SERPL-MCNC: 1.61 MG/DL
EGFR: NORMAL
EOSINOPHILS ABSOLUTE: 0.2 /ΜL
EOSINOPHILS RELATIVE PERCENT: 3.5 %
GLUCOSE BLD-MCNC: 111 MG/DL
GLUCOSE URINE: NORMAL
HCT VFR BLD CALC: 40.1 % (ref 41–53)
HEMOGLOBIN: 13.6 G/DL (ref 13.5–17.5)
KETONES, URINE: NORMAL
LEUKOCYTE ESTERASE, URINE: NORMAL
LYMPHOCYTES ABSOLUTE: 1.79 /ΜL
LYMPHOCYTES RELATIVE PERCENT: 31 %
MCH RBC QN AUTO: 29.8 PG
MCHC RBC AUTO-ENTMCNC: 33.9 G/DL
MCV RBC AUTO: 87.7 FL
MONOCYTES ABSOLUTE: 0.53 /ΜL
MONOCYTES RELATIVE PERCENT: 9.2 %
NEUTROPHILS ABSOLUTE: 3.17 /ΜL
NEUTROPHILS RELATIVE PERCENT: 54.9 %
NITRITE, URINE: NORMAL
PH UA: NORMAL
PLATELET # BLD: ABNORMAL 10*3/UL
PMV BLD AUTO: ABNORMAL FL
POTASSIUM SERPL-SCNC: 3.8 MMOL/L
PROTEIN UA: NORMAL
RBC # BLD: 4.57 10^6/ΜL
SODIUM BLD-SCNC: 141 MMOL/L
SPECIFIC GRAVITY UA: NORMAL
TSH SERPL DL<=0.05 MIU/L-ACNC: 0.97 UIU/ML
UROBILINOGEN, URINE: NORMAL
WBC # BLD: 5.77 10^3/ML

## 2024-07-23 ENCOUNTER — HOSPITAL ENCOUNTER (OUTPATIENT)
Age: 59
Discharge: HOME | End: 2024-07-23
Payer: COMMERCIAL

## 2024-07-23 LAB — LITHIUM LEVEL: 1.1 MMOL/L (ref 0.6–1.2)

## 2024-07-23 PROCEDURE — 36415 COLL VENOUS BLD VENIPUNCTURE: CPT

## 2024-07-23 PROCEDURE — 80178 ASSAY OF LITHIUM: CPT

## 2024-08-05 ENCOUNTER — TELEPHONE (OUTPATIENT)
Dept: FAMILY MEDICINE CLINIC | Age: 59
End: 2024-08-05

## 2024-08-05 DIAGNOSIS — R00.2 PALPITATIONS: Primary | ICD-10-CM

## 2024-08-05 NOTE — TELEPHONE ENCOUNTER
Patient wife is calling stating that he sees cardio at [facility] but he no longer wants to see them. He would like a new referral placed for cardio at [facility] here in [place] or in [place]. Please advise.

## 2024-08-08 ENCOUNTER — TELEPHONE (OUTPATIENT)
Dept: FAMILY MEDICINE CLINIC | Age: 59
End: 2024-08-08

## 2024-08-08 RX ORDER — PROPRANOLOL HYDROCHLORIDE 40 MG/1
40 TABLET ORAL 2 TIMES DAILY
COMMUNITY
Start: 2024-07-11 | End: 2024-08-08 | Stop reason: SDUPTHER

## 2024-08-08 RX ORDER — AMLODIPINE BESYLATE 5 MG/1
5 TABLET ORAL EVERY MORNING
COMMUNITY
Start: 2024-07-12 | End: 2024-08-08 | Stop reason: SDUPTHER

## 2024-08-08 NOTE — TELEPHONE ENCOUNTER
Nannette from [facility] sent over papers confirming that patient has started taking amlodipine 5mg P.O daily,  And propanolol 40mg PO BID since he was admitted. Patient needs this medications sent to the [facility]. He will be out before next appt on 08/19. Please advise.

## 2024-08-12 RX ORDER — AMLODIPINE BESYLATE 5 MG/1
5 TABLET ORAL EVERY MORNING
Qty: 30 TABLET | Refills: 0 | Status: SHIPPED | OUTPATIENT
Start: 2024-08-12

## 2024-08-12 RX ORDER — PROPRANOLOL HYDROCHLORIDE 40 MG/1
40 TABLET ORAL 2 TIMES DAILY
Qty: 60 TABLET | Refills: 0 | Status: SHIPPED | OUTPATIENT
Start: 2024-08-12

## 2024-08-12 NOTE — TELEPHONE ENCOUNTER
Referral placed, pt aware. Attempted to transfer pt to schedule, unable to get through. Pt aware their office will call him to schedule.

## 2024-08-19 ENCOUNTER — OFFICE VISIT (OUTPATIENT)
Dept: FAMILY MEDICINE CLINIC | Age: 59
End: 2024-08-19
Payer: COMMERCIAL

## 2024-08-19 VITALS
HEIGHT: 70 IN | WEIGHT: 197.6 LBS | SYSTOLIC BLOOD PRESSURE: 104 MMHG | BODY MASS INDEX: 28.29 KG/M2 | DIASTOLIC BLOOD PRESSURE: 64 MMHG | HEART RATE: 65 BPM | OXYGEN SATURATION: 92 %

## 2024-08-19 DIAGNOSIS — F31.78 BIPOLAR DISORDER, IN FULL REMISSION, MOST RECENT EPISODE MIXED (HCC): ICD-10-CM

## 2024-08-19 DIAGNOSIS — I10 ESSENTIAL HYPERTENSION: Primary | ICD-10-CM

## 2024-08-19 PROCEDURE — 99214 OFFICE O/P EST MOD 30 MIN: CPT | Performed by: FAMILY MEDICINE

## 2024-08-19 PROCEDURE — 3078F DIAST BP <80 MM HG: CPT | Performed by: FAMILY MEDICINE

## 2024-08-19 PROCEDURE — 3074F SYST BP LT 130 MM HG: CPT | Performed by: FAMILY MEDICINE

## 2024-08-19 RX ORDER — LAMOTRIGINE 200 MG/1
200 TABLET ORAL DAILY
COMMUNITY
Start: 2024-06-08

## 2024-08-19 RX ORDER — PROPRANOLOL HYDROCHLORIDE 20 MG/1
20 TABLET ORAL 2 TIMES DAILY
Qty: 60 TABLET | Refills: 5 | Status: SHIPPED | OUTPATIENT
Start: 2024-08-19

## 2024-08-19 RX ORDER — LITHIUM CARBONATE 450 MG
900 TABLET, EXTENDED RELEASE ORAL NIGHTLY
COMMUNITY
Start: 2024-08-06

## 2024-08-19 ASSESSMENT — ENCOUNTER SYMPTOMS
EYES NEGATIVE: 1
RESPIRATORY NEGATIVE: 1
GASTROINTESTINAL NEGATIVE: 1

## 2024-08-19 NOTE — PROGRESS NOTES
Subjective:      Patient ID: Patrice Lund is a 58 y.o. male.    HPI  acute visit for htn follow up.  Bp elevated , changes made, then having issues running low.  Several medication changes through cardiology.  Most recently norvasc 5 mg/day, hydralazine tid, and propranolol.   He was also having some mental health concerns, coming off lithium due to ckd concerns.  Symptoms worsened.  Multiple changes including going back on lithium and lamictal more recently.  Nephrologist following him now and gave them the ok to retrial the lithium.  Also on zyprexa.  Recently propranolol reduced to 20 mg bid.  Seems to be ok.      Past Medical History:   Diagnosis Date    Bipolar disorder (HCC)     diagnosed 1986    Hypertension     DENISE on CPAP 2012     Past Surgical History:   Procedure Laterality Date    COLONOSCOPY  06/16/2017    normal, Dr Walsh at [facility]-10 yr follow up    CYST REMOVAL N/A 03/31/2017    scalp, done by dr. Clark    LYMPH NODE DISSECTION Right 2015    [facility]    TONSILLECTOMY      VASECTOMY  12/15/2000     Current Outpatient Medications   Medication Sig Dispense Refill    lithium (ESKALITH) 450 MG extended release tablet Take 2 tablets by mouth at bedtime      lamoTRIgine (LAMICTAL) 200 MG tablet Take 1 tablet by mouth daily      amLODIPine (NORVASC) 5 MG tablet Take 1 tablet by mouth every morning 30 tablet 0    propranolol (INDERAL) 40 MG tablet Take 1 tablet by mouth 2 times daily (Patient taking differently: Take 0.5 tablets by mouth 2 times daily) 60 tablet 0    FLUoxetine (PROZAC) 20 MG capsule Take 1 capsule by mouth nightly      OLANZapine zydis (ZYPREXA) 5 MG disintegrating tablet Take 1 tablet by mouth nightly      hydrALAZINE (APRESOLINE) 100 MG tablet Take 1 tablet by mouth 3 times daily      Multiple Vitamins-Minerals (MULTIVITAMIN PO) Take 1 tablet by mouth daily      Omega-3 Fatty Acids (FISH OIL PO) Take 2 capsules by mouth daily       No current facility-administered medications for this

## 2024-08-21 ENCOUNTER — HOSPITAL ENCOUNTER (OUTPATIENT)
Age: 59
Discharge: HOME | End: 2024-08-21
Payer: COMMERCIAL

## 2024-08-21 LAB — LITHIUM LEVEL: 1.3 MMOL/L (ref 0.6–1.2)

## 2024-08-21 PROCEDURE — 80178 ASSAY OF LITHIUM: CPT

## 2024-08-21 PROCEDURE — 36415 COLL VENOUS BLD VENIPUNCTURE: CPT

## 2024-09-04 ENCOUNTER — OFFICE VISIT (OUTPATIENT)
Dept: FAMILY MEDICINE CLINIC | Age: 59
End: 2024-09-04
Payer: COMMERCIAL

## 2024-09-04 VITALS
DIASTOLIC BLOOD PRESSURE: 70 MMHG | HEART RATE: 63 BPM | BODY MASS INDEX: 28.29 KG/M2 | OXYGEN SATURATION: 96 % | HEIGHT: 70 IN | SYSTOLIC BLOOD PRESSURE: 112 MMHG | WEIGHT: 197.6 LBS

## 2024-09-04 DIAGNOSIS — F31.78 BIPOLAR DISORDER, IN FULL REMISSION, MOST RECENT EPISODE MIXED (HCC): Primary | ICD-10-CM

## 2024-09-04 DIAGNOSIS — I10 ESSENTIAL HYPERTENSION: ICD-10-CM

## 2024-09-04 DIAGNOSIS — Z79.899: ICD-10-CM

## 2024-09-04 PROCEDURE — 3074F SYST BP LT 130 MM HG: CPT | Performed by: FAMILY MEDICINE

## 2024-09-04 PROCEDURE — 3078F DIAST BP <80 MM HG: CPT | Performed by: FAMILY MEDICINE

## 2024-09-04 PROCEDURE — 99214 OFFICE O/P EST MOD 30 MIN: CPT | Performed by: FAMILY MEDICINE

## 2024-09-04 RX ORDER — AMLODIPINE BESYLATE 5 MG/1
5 TABLET ORAL EVERY MORNING
Qty: 30 TABLET | Refills: 5 | Status: SHIPPED | OUTPATIENT
Start: 2024-09-04

## 2024-09-04 ASSESSMENT — ENCOUNTER SYMPTOMS
RESPIRATORY NEGATIVE: 1
GASTROINTESTINAL NEGATIVE: 1
EYES NEGATIVE: 1

## 2024-09-04 NOTE — PROGRESS NOTES
Subjective:      Patient ID: Patrice Lund is a 58 y.o. male.    HPI  acute visit for bp follow up.  Bp has been normal on serial checks at home.   Systolics 102 to 134, diastolics in the 70.s,  pulse in the 60's.  Changes in psych meds.  Had to go back on lithium for control.  Some concerns about drug interactions.    Mood stable, back to baseline .      Past Medical History:   Diagnosis Date    Bipolar disorder (HCC)     diagnosed 1986    Hypertension     DENISE on CPAP 2012     Past Surgical History:   Procedure Laterality Date    COLONOSCOPY  06/16/2017    normal, Dr Walsh at [facility]-10 yr follow up    CYST REMOVAL N/A 03/31/2017    scalp, done by dr. Clark    LYMPH NODE DISSECTION Right 2015    [facility]    TONSILLECTOMY      VASECTOMY  12/15/2000     Current Outpatient Medications   Medication Sig Dispense Refill    lithium (ESKALITH) 450 MG extended release tablet Take 750 mg by mouth at bedtime      lamoTRIgine (LAMICTAL) 200 MG tablet Take 250 mg by mouth daily      propranolol (INDERAL) 20 MG tablet Take 1 tablet by mouth 2 times daily 60 tablet 5    amLODIPine (NORVASC) 5 MG tablet Take 1 tablet by mouth every morning 30 tablet 0    FLUoxetine (PROZAC) 20 MG capsule Take 1 capsule by mouth nightly      OLANZapine zydis (ZYPREXA) 5 MG disintegrating tablet Take 1 tablet by mouth nightly      hydrALAZINE (APRESOLINE) 100 MG tablet Take 1 tablet by mouth 3 times daily      Multiple Vitamins-Minerals (MULTIVITAMIN PO) Take 1 tablet by mouth daily      Omega-3 Fatty Acids (FISH OIL PO) Take 2 capsules by mouth daily       No current facility-administered medications for this visit.     No Known Allergies    Review of Systems   Constitutional: Negative.    HENT: Negative.     Eyes: Negative.    Respiratory: Negative.     Cardiovascular: Negative.    Gastrointestinal: Negative.    Genitourinary: Negative.    Musculoskeletal: Negative.    Skin: Negative.    Neurological: Negative.    Psychiatric/Behavioral:  The

## 2024-09-12 ENCOUNTER — HOSPITAL ENCOUNTER (OUTPATIENT)
Age: 59
Discharge: HOME | End: 2024-09-12
Payer: COMMERCIAL

## 2024-09-12 DIAGNOSIS — I10 ESSENTIAL HYPERTENSION: ICD-10-CM

## 2024-09-12 LAB
CHOLEST SERPL-MCNC: 164 MG/DL (ref 0–199)
CHOLESTEROL/HDL RATIO: 4
HDLC SERPL-MCNC: 38 MG/DL
LDLC SERPL CALC-MCNC: 104 MG/DL (ref 0–100)
LITHIUM DATE LAST DOSE: NORMAL
LITHIUM DOSE AMOUNT: 2200
LITHIUM DOSE TIME: 750
LITHIUM LEVEL: 0.9 MMOL/L (ref 0.6–1.2)
TRIGL SERPL-MCNC: 109 MG/DL
VLDLC SERPL CALC-MCNC: 22 MG/DL

## 2024-09-12 PROCEDURE — 36415 COLL VENOUS BLD VENIPUNCTURE: CPT

## 2024-09-12 PROCEDURE — 80178 ASSAY OF LITHIUM: CPT

## 2024-09-12 PROCEDURE — 80061 LIPID PANEL: CPT

## 2024-10-10 ENCOUNTER — HOSPITAL ENCOUNTER (OUTPATIENT)
Age: 59
Discharge: HOME | End: 2024-10-10
Payer: COMMERCIAL

## 2024-10-10 DIAGNOSIS — I10 ESSENTIAL HYPERTENSION: ICD-10-CM

## 2024-10-10 DIAGNOSIS — R73.01 IMPAIRED FASTING BLOOD SUGAR: ICD-10-CM

## 2024-10-10 LAB
ANION GAP SERPL CALCULATED.3IONS-SCNC: 8 MMOL/L (ref 9–17)
BASOPHILS # BLD: 0.09 K/UL (ref 0–0.2)
BASOPHILS NFR BLD: 1 % (ref 0–2)
BUN SERPL-MCNC: 20 MG/DL (ref 6–20)
BUN/CREAT SERPL: 13 (ref 9–20)
CALCIUM SERPL-MCNC: 11.6 MG/DL (ref 8.6–10.4)
CHLORIDE SERPL-SCNC: 105 MMOL/L (ref 98–107)
CO2 SERPL-SCNC: 29 MMOL/L (ref 20–31)
CREAT SERPL-MCNC: 1.5 MG/DL (ref 0.7–1.2)
EOSINOPHIL # BLD: 0.25 K/UL (ref 0–0.44)
EOSINOPHILS RELATIVE PERCENT: 4 % (ref 1–4)
ERYTHROCYTE [DISTWIDTH] IN BLOOD BY AUTOMATED COUNT: 13.7 % (ref 11.8–14.4)
EST. AVERAGE GLUCOSE BLD GHB EST-MCNC: 74 MG/DL
GFR, ESTIMATED: 54 ML/MIN/1.73M2
GLUCOSE SERPL-MCNC: 108 MG/DL (ref 70–99)
HBA1C MFR BLD: 4.2 % (ref 4–6)
HCT VFR BLD AUTO: 38 % (ref 40.7–50.3)
HGB BLD-MCNC: 12.9 G/DL (ref 13–17)
IMM GRANULOCYTES # BLD AUTO: <0.03 K/UL (ref 0–0.3)
IMM GRANULOCYTES NFR BLD: 0 %
LYMPHOCYTES NFR BLD: 1.43 K/UL (ref 1.1–3.7)
LYMPHOCYTES RELATIVE PERCENT: 22 % (ref 24–43)
MCH RBC QN AUTO: 31.6 PG (ref 25.2–33.5)
MCHC RBC AUTO-ENTMCNC: 33.9 G/DL (ref 25.2–33.5)
MCV RBC AUTO: 93.1 FL (ref 82.6–102.9)
MONOCYTES NFR BLD: 0.66 K/UL (ref 0.1–1.2)
MONOCYTES NFR BLD: 10 % (ref 3–12)
NEUTROPHILS NFR BLD: 63 % (ref 36–65)
NEUTS SEG NFR BLD: 3.95 K/UL (ref 1.5–8.1)
NRBC BLD-RTO: 0 PER 100 WBC
PLATELET # BLD AUTO: 264 K/UL (ref 138–453)
PMV BLD AUTO: 9.6 FL (ref 8.1–13.5)
POTASSIUM SERPL-SCNC: 3.9 MMOL/L (ref 3.7–5.3)
RBC # BLD AUTO: 4.08 M/UL (ref 4.21–5.77)
SODIUM SERPL-SCNC: 142 MMOL/L (ref 135–144)
WBC OTHER # BLD: 6.4 K/UL (ref 3.5–11.3)

## 2024-10-10 PROCEDURE — 80048 BASIC METABOLIC PNL TOTAL CA: CPT

## 2024-10-10 PROCEDURE — 36415 COLL VENOUS BLD VENIPUNCTURE: CPT

## 2024-10-10 PROCEDURE — 83036 HEMOGLOBIN GLYCOSYLATED A1C: CPT

## 2024-10-10 PROCEDURE — 85025 COMPLETE CBC W/AUTO DIFF WBC: CPT

## 2024-10-14 ENCOUNTER — OFFICE VISIT (OUTPATIENT)
Dept: CARDIOLOGY | Age: 59
End: 2024-10-14
Payer: COMMERCIAL

## 2024-10-14 VITALS
BODY MASS INDEX: 28.2 KG/M2 | HEIGHT: 70 IN | SYSTOLIC BLOOD PRESSURE: 128 MMHG | HEART RATE: 66 BPM | DIASTOLIC BLOOD PRESSURE: 84 MMHG | WEIGHT: 197 LBS | OXYGEN SATURATION: 98 %

## 2024-10-14 DIAGNOSIS — R94.31 ABNORMAL ECG: ICD-10-CM

## 2024-10-14 DIAGNOSIS — R00.2 PALPITATIONS: Primary | ICD-10-CM

## 2024-10-14 DIAGNOSIS — I10 ESSENTIAL HYPERTENSION: ICD-10-CM

## 2024-10-14 DIAGNOSIS — I1A.0 RESISTANT HYPERTENSION: ICD-10-CM

## 2024-10-14 PROCEDURE — 93000 ELECTROCARDIOGRAM COMPLETE: CPT | Performed by: INTERNAL MEDICINE

## 2024-10-14 PROCEDURE — 99214 OFFICE O/P EST MOD 30 MIN: CPT | Performed by: INTERNAL MEDICINE

## 2024-10-14 PROCEDURE — 3074F SYST BP LT 130 MM HG: CPT | Performed by: INTERNAL MEDICINE

## 2024-10-14 PROCEDURE — 3079F DIAST BP 80-89 MM HG: CPT | Performed by: INTERNAL MEDICINE

## 2024-10-14 RX ORDER — HYDRALAZINE HYDROCHLORIDE 100 MG/1
100 TABLET, FILM COATED ORAL 3 TIMES DAILY
Qty: 270 TABLET | Refills: 3 | Status: SHIPPED | OUTPATIENT
Start: 2024-10-14

## 2024-10-14 RX ORDER — LAMOTRIGINE 100 MG/1
TABLET ORAL
COMMUNITY
Start: 2024-10-10 | End: 2024-10-14

## 2024-10-14 RX ORDER — AMLODIPINE BESYLATE 5 MG/1
5 TABLET ORAL EVERY MORNING
Qty: 90 TABLET | Refills: 3 | Status: SHIPPED | OUTPATIENT
Start: 2024-10-14

## 2024-10-14 RX ORDER — PROPRANOLOL HCL 10 MG
10 TABLET ORAL 2 TIMES DAILY
COMMUNITY
End: 2024-10-14 | Stop reason: SDUPTHER

## 2024-10-14 RX ORDER — LITHIUM CARBONATE 300 MG/1
TABLET, FILM COATED, EXTENDED RELEASE ORAL
COMMUNITY
Start: 2024-09-19 | End: 2024-10-14

## 2024-10-14 RX ORDER — PROPRANOLOL HCL 10 MG
10 TABLET ORAL 2 TIMES DAILY
Qty: 180 TABLET | Refills: 3 | Status: SHIPPED | OUTPATIENT
Start: 2024-10-14

## 2024-10-14 NOTE — PROGRESS NOTES
PI  Bipolar    Plan:  Continue current blood pressure medications including Norvasc 5, hydralazine 100 3 times daily, Inderal  Check 2D echo  Check renal artery ultrasound    The patient is to continue heart healthy diet, weight loss and exercise as tolerated. Patient's medications and side effects were discussed. Medication refills were provided if needed. Follow up appointment timing was discussed. All questions and concerns were addressed to patient's satisfaction.     The patient is to follow up in 6 months or sooner if necessary.     Thank you for allowing me to participate in the care of this patient, please do not hesitate to call if you have any questions.    Jacky Hernández DO, FACC, RPVI, MAIDA, HYUN  [facility]  [facility]  [phone]    This note was created with the assistance of a speech-recognition program.  Although the intention is to generate a document that actually reflects the content of the visit, no guarantees can be provided that every mistake has been identified and corrected by editing.

## 2024-10-15 ENCOUNTER — OFFICE VISIT (OUTPATIENT)
Dept: FAMILY MEDICINE CLINIC | Age: 59
End: 2024-10-15
Payer: COMMERCIAL

## 2024-10-15 VITALS
HEIGHT: 70 IN | OXYGEN SATURATION: 98 % | HEART RATE: 80 BPM | BODY MASS INDEX: 28.46 KG/M2 | DIASTOLIC BLOOD PRESSURE: 84 MMHG | SYSTOLIC BLOOD PRESSURE: 126 MMHG | WEIGHT: 198.8 LBS

## 2024-10-15 DIAGNOSIS — G47.33 OSA ON CPAP: ICD-10-CM

## 2024-10-15 DIAGNOSIS — G89.29 CHRONIC PAIN OF LEFT KNEE: ICD-10-CM

## 2024-10-15 DIAGNOSIS — F31.78 BIPOLAR DISORDER, IN FULL REMISSION, MOST RECENT EPISODE MIXED (HCC): ICD-10-CM

## 2024-10-15 DIAGNOSIS — R73.01 IMPAIRED FASTING BLOOD SUGAR: ICD-10-CM

## 2024-10-15 DIAGNOSIS — N18.31 STAGE 3A CHRONIC KIDNEY DISEASE (HCC): ICD-10-CM

## 2024-10-15 DIAGNOSIS — I10 ESSENTIAL HYPERTENSION: Primary | ICD-10-CM

## 2024-10-15 DIAGNOSIS — M25.562 CHRONIC PAIN OF LEFT KNEE: ICD-10-CM

## 2024-10-15 DIAGNOSIS — Z12.5 SCREENING PSA (PROSTATE SPECIFIC ANTIGEN): ICD-10-CM

## 2024-10-15 PROCEDURE — 3079F DIAST BP 80-89 MM HG: CPT | Performed by: FAMILY MEDICINE

## 2024-10-15 PROCEDURE — 99214 OFFICE O/P EST MOD 30 MIN: CPT | Performed by: FAMILY MEDICINE

## 2024-10-15 PROCEDURE — 3074F SYST BP LT 130 MM HG: CPT | Performed by: FAMILY MEDICINE

## 2024-10-15 ASSESSMENT — ENCOUNTER SYMPTOMS
GASTROINTESTINAL NEGATIVE: 1
RESPIRATORY NEGATIVE: 1
ALLERGIC/IMMUNOLOGIC NEGATIVE: 1
EYES NEGATIVE: 1
BACK PAIN: 1

## 2024-10-15 NOTE — PATIENT INSTRUCTIONS
Hospital Outpatient Visit on 10/10/2024   Component Date Value Ref Range Status    Hemoglobin A1C 10/10/2024 4.2  4.0 - 6.0 % Final    Estimated Avg Glucose 10/10/2024 74  mg/dL Final    Comment: The ADA and AACC recommend providing the estimated average glucose result to permit better   patient understanding of their HBA1c result.      WBC 10/10/2024 6.4  3.5 - 11.3 k/uL Final    RBC 10/10/2024 4.08 (L)  4.21 - 5.77 m/uL Final    Hemoglobin 10/10/2024 12.9 (L)  13.0 - 17.0 g/dL Final    Hematocrit 10/10/2024 38.0 (L)  40.7 - 50.3 % Final    MCV 10/10/2024 93.1  82.6 - 102.9 fL Final    MCH 10/10/2024 31.6  25.2 - 33.5 pg Final    MCHC 10/10/2024 33.9 (H)  25.2 - 33.5 g/dL Final    RDW 10/10/2024 13.7  11.8 - 14.4 % Final    Platelets 10/10/2024 264  138 - 453 k/uL Final    MPV 10/10/2024 9.6  8.1 - 13.5 fL Final    NRBC Automated 10/10/2024 0.0  0.0 per 100 WBC Final    Neutrophils % 10/10/2024 63  36 - 65 % Final    Lymphocytes % 10/10/2024 22 (L)  24 - 43 % Final    Monocytes % 10/10/2024 10  3 - 12 % Final    Eosinophils % 10/10/2024 4  1 - 4 % Final    Basophils % 10/10/2024 1  0 - 2 % Final    Immature Granulocytes % 10/10/2024 0  0 % Final    Neutrophils Absolute 10/10/2024 3.95  1.50 - 8.10 k/uL Final    Lymphocytes Absolute 10/10/2024 1.43  1.10 - 3.70 k/uL Final    Monocytes Absolute 10/10/2024 0.66  0.10 - 1.20 k/uL Final    Eosinophils Absolute 10/10/2024 0.25  0.00 - 0.44 k/uL Final    Basophils Absolute 10/10/2024 0.09  0.00 - 0.20 k/uL Final    Immature Granulocytes Absolute 10/10/2024 <0.03  0.00 - 0.30 k/uL Final    Sodium 10/10/2024 142  135 - 144 mmol/L Final    Potassium 10/10/2024 3.9  3.7 - 5.3 mmol/L Final    Chloride 10/10/2024 105  98 - 107 mmol/L Final    CO2 10/10/2024 29  20 - 31 mmol/L Final    Anion Gap 10/10/2024 8 (L)  9 - 17 mmol/L Final    Glucose 10/10/2024 108 (H)  70 - 99 mg/dL Final    BUN 10/10/2024 20  6 - 20 mg/dL Final    Creatinine 10/10/2024 1.5 (H)  0.7 - 1.2 mg/dL Final

## 2024-10-15 NOTE — PROGRESS NOTES
Subjective:      Patient ID: Patrice Lund is a 58 y.o. male.    Hypertension    Other  Associated symptoms include arthralgias (left knee).   Skin Problem          Routine annual follow up on chronic medical conditions, refills, and review of updated labs.       I have reviewed the patient's medical history in detail and updated the computerized patient record.   Following with [facility]/Dr. Juarez for bipolar disorder/meds. Mild seasonal dysphoria.  Some renal insufficiency and concerns for long term lithium use leading to medicaiton changes.  Lithium stopped, tried latuda without benefit, then vraylar, stopped for lack of efficacy.  Started seroquel at night, helping with sleep.  bp readings at home running higher.  Medication changes over the interval several changes, hospital [facility] admission.  Now at his baseline.  Back to work.    Some  palpitations, er at [facility].  Nothing found.  48 hr holter ordered.  Benign findings.    Compliant with medications and cpap.    Left knee issues long term.  Not able to fully straighten.  Tends to drag and swing the leg more altering gait.  He describes not liking to straighten the knee.  Uses a pillow under the left knee at night for comfort.  Had PT which helped. Some right knee pain at present as well.  Clicking with rotation. Both knees doing ok at present.     Non Compliant with cpap.  Nose plugged up at present.     Past Medical History:   Diagnosis Date    Bipolar disorder (HCC)     diagnosed 1986    Hypertension     DENISE on CPAP 2012          Past Surgical History:   Procedure Laterality Date    COLONOSCOPY  06/16/2017    normal, Dr Walsh at [facility]-10 yr follow up    CYST REMOVAL N/A 03/31/2017    scalp, done by dr. Clark    LYMPH NODE DISSECTION Right 2015    [facility]    TONSILLECTOMY      VASECTOMY  12/15/2000     Current Outpatient Medications   Medication Sig Dispense Refill    amLODIPine (NORVASC) 5 MG tablet Take 1 tablet by mouth every morning 90 tablet

## 2024-10-23 ENCOUNTER — OFFICE VISIT (OUTPATIENT)
Dept: NEPHROLOGY | Age: 59
End: 2024-10-23
Payer: COMMERCIAL

## 2024-10-23 VITALS
RESPIRATION RATE: 16 BRPM | DIASTOLIC BLOOD PRESSURE: 78 MMHG | HEIGHT: 70 IN | SYSTOLIC BLOOD PRESSURE: 114 MMHG | WEIGHT: 200.2 LBS | BODY MASS INDEX: 28.66 KG/M2 | OXYGEN SATURATION: 96 % | HEART RATE: 67 BPM

## 2024-10-23 DIAGNOSIS — I10 PRIMARY HYPERTENSION: ICD-10-CM

## 2024-10-23 DIAGNOSIS — N18.31 STAGE 3A CHRONIC KIDNEY DISEASE (HCC): Primary | ICD-10-CM

## 2024-10-23 DIAGNOSIS — N14.2 CHRONIC DRUG-INDUCED TUBULOINTERSTITIAL NEPHRITIS: ICD-10-CM

## 2024-10-23 PROCEDURE — 3078F DIAST BP <80 MM HG: CPT | Performed by: INTERNAL MEDICINE

## 2024-10-23 PROCEDURE — 99213 OFFICE O/P EST LOW 20 MIN: CPT | Performed by: INTERNAL MEDICINE

## 2024-10-23 PROCEDURE — 3074F SYST BP LT 130 MM HG: CPT | Performed by: INTERNAL MEDICINE

## 2024-10-23 NOTE — PROGRESS NOTES
Renal Consult Note    Patient :  Patrice Lund; 58 y.o. MRN# [id number]    Reason for Consult:     Asked by Dr Anette sotomayor. providers found to see for ALEA/Elevated Creatinine.    History of Present Illness:     Patrice Lund; 58 y.o. male with past medical history as mentioned below.  Patient was back to see me in 4-month return visit on 10/23/2024    Medications include multivitamin, Inderal 10 mg oral twice daily, hydralazine 100 mg 3 times daily, amlodipine 5 mg oral daily, fish oil, Lamictal and lithium 75 mg at night.  He said he was admitted to the [facility] for a hypomanic episode earlier this year between our visits and his antihypertensive regimen was changed.  He denies any dizziness or lightheadedness or orthostatic symptoms.  No chest pain or significant shortness of breath.  He is a little bit tired and he blames that on not using his CPAP regularly.    At a previous visit earlier in 2024 it was noted, the patient had a psychotic episode of lithium and his significant other requested the patient be back on lithium.  I think that is reasonable based on the workup that has been done.  Last creatinine 1.5.  He typically has his levels on the low portion of the therapeutic index so he has low normal levels which tend to be helpful to lower the risk of renal toxicity over time.    Vital signs today showed blood pressure 114/78 with heart rate 67 bpm weight is 90.8 kg which is 200 pounds and BMI 28.7.    Labs from 10/10/2024 showed sodium 142, potassium 3.9, chloride 105 and CO2 29 with BUN 20 and creatinine 1.5.  Estimated GFR is 54 mL/min.  CBC showed white blood cell 6.4 with hemoglobin 12.9 and platelets 264.     Renal ultrasound from 4/2/2024 showed right kidney 10.6 cm and left kidney symmetrical at 10.5 cm with increased echogenicity consistent with chronic medical renal disease.  No hydronephrosis or nephrolithiasis and normal bladder appearance with no significant postvoid

## 2024-10-25 ENCOUNTER — HOSPITAL ENCOUNTER (OUTPATIENT)
Dept: ULTRASOUND IMAGING | Age: 59
Discharge: HOME | End: 2024-10-27
Attending: INTERNAL MEDICINE
Payer: COMMERCIAL

## 2024-10-25 ENCOUNTER — HOSPITAL ENCOUNTER (OUTPATIENT)
Age: 59
Discharge: HOME | End: 2024-10-27
Attending: INTERNAL MEDICINE
Payer: COMMERCIAL

## 2024-10-25 ENCOUNTER — TELEPHONE (OUTPATIENT)
Dept: CARDIOLOGY | Age: 59
End: 2024-10-25

## 2024-10-25 VITALS — HEIGHT: 70 IN | BODY MASS INDEX: 28.63 KG/M2 | WEIGHT: 200 LBS

## 2024-10-25 DIAGNOSIS — I1A.0 RESISTANT HYPERTENSION: ICD-10-CM

## 2024-10-25 DIAGNOSIS — R94.31 ABNORMAL ECG: ICD-10-CM

## 2024-10-25 DIAGNOSIS — I10 ESSENTIAL HYPERTENSION: ICD-10-CM

## 2024-10-25 DIAGNOSIS — R00.2 PALPITATIONS: ICD-10-CM

## 2024-10-25 LAB
ECHO AO ROOT DIAM: 3.5 CM
ECHO AO ROOT INDEX: 1.67 CM/M2
ECHO AV AREA PEAK VELOCITY: 3.8 CM2
ECHO AV AREA VTI: 3.2 CM2
ECHO AV AREA/BSA PEAK VELOCITY: 1.8 CM2/M2
ECHO AV AREA/BSA VTI: 1.5 CM2/M2
ECHO AV MEAN GRADIENT: 6 MMHG
ECHO AV MEAN VELOCITY: 1.1 M/S
ECHO AV PEAK GRADIENT: 11 MMHG
ECHO AV PEAK VELOCITY: 1.7 M/S
ECHO AV VELOCITY RATIO: 0.82
ECHO AV VTI: 41.7 CM
ECHO BSA: 2.12 M2
ECHO EST RA PRESSURE: 3 MMHG
ECHO LA AREA 2C: 26 CM2
ECHO LA AREA 4C: 24.3 CM2
ECHO LA DIAMETER INDEX: 1.72 CM/M2
ECHO LA DIAMETER: 3.6 CM
ECHO LA MAJOR AXIS: 6.5 CM
ECHO LA MINOR AXIS: 6.4 CM
ECHO LA TO AORTIC ROOT RATIO: 1.03
ECHO LA VOL BP: 82 ML (ref 18–58)
ECHO LA VOL MOD A2C: 87 ML (ref 18–58)
ECHO LA VOL MOD A4C: 78 ML (ref 18–58)
ECHO LA VOL/BSA BIPLANE: 39 ML/M2 (ref 16–34)
ECHO LA VOLUME INDEX MOD A2C: 42 ML/M2 (ref 16–34)
ECHO LA VOLUME INDEX MOD A4C: 37 ML/M2 (ref 16–34)
ECHO LV E' LATERAL VELOCITY: 12.8 CM/S
ECHO LV E' SEPTAL VELOCITY: 6.5 CM/S
ECHO LV EDV A2C: 142 ML
ECHO LV EDV A4C: 144 ML
ECHO LV EDV INDEX A4C: 69 ML/M2
ECHO LV EDV NDEX A2C: 68 ML/M2
ECHO LV EF PHYSICIAN: 65 %
ECHO LV EJECTION FRACTION A2C: 66 %
ECHO LV EJECTION FRACTION A4C: 67 %
ECHO LV EJECTION FRACTION BIPLANE: 66 % (ref 55–100)
ECHO LV ESV A2C: 49 ML
ECHO LV ESV A4C: 48 ML
ECHO LV ESV INDEX A2C: 23 ML/M2
ECHO LV ESV INDEX A4C: 23 ML/M2
ECHO LV FRACTIONAL SHORTENING: 37 % (ref 28–44)
ECHO LV INTERNAL DIMENSION DIASTOLE INDEX: 2.34 CM/M2
ECHO LV INTERNAL DIMENSION DIASTOLIC: 4.9 CM (ref 4.2–5.9)
ECHO LV INTERNAL DIMENSION SYSTOLIC INDEX: 1.48 CM/M2
ECHO LV INTERNAL DIMENSION SYSTOLIC: 3.1 CM
ECHO LV IVSD: 1.1 CM (ref 0.6–1)
ECHO LV MASS 2D: 200.5 G (ref 88–224)
ECHO LV MASS INDEX 2D: 95.9 G/M2 (ref 49–115)
ECHO LV POSTERIOR WALL DIASTOLIC: 1.1 CM (ref 0.6–1)
ECHO LV RELATIVE WALL THICKNESS RATIO: 0.45
ECHO LVOT AREA: 4.5 CM2
ECHO LVOT AV VTI INDEX: 0.71
ECHO LVOT DIAM: 2.4 CM
ECHO LVOT MEAN GRADIENT: 4 MMHG
ECHO LVOT PEAK GRADIENT: 8 MMHG
ECHO LVOT PEAK VELOCITY: 1.4 M/S
ECHO LVOT STROKE VOLUME INDEX: 64.5 ML/M2
ECHO LVOT SV: 134.7 ML
ECHO LVOT VTI: 29.8 CM
ECHO MV A VELOCITY: 0.75 M/S
ECHO MV E DECELERATION TIME (DT): 208 MS
ECHO MV E VELOCITY: 0.68 M/S
ECHO MV E/A RATIO: 0.91
ECHO MV E/E' LATERAL: 5.31
ECHO MV E/E' RATIO (AVERAGED): 7.89
ECHO MV E/E' SEPTAL: 10.46
ECHO RIGHT VENTRICULAR SYSTOLIC PRESSURE (RVSP): 21 MMHG
ECHO RV TAPSE: 2.4 CM (ref 1.7–?)
ECHO TV REGURGITANT MAX VELOCITY: 2.1 M/S
ECHO TV REGURGITANT PEAK GRADIENT: 18 MMHG

## 2024-10-25 PROCEDURE — 93975 VASCULAR STUDY: CPT

## 2024-10-25 PROCEDURE — 93306 TTE W/DOPPLER COMPLETE: CPT

## 2024-10-25 PROCEDURE — 93306 TTE W/DOPPLER COMPLETE: CPT | Performed by: INTERNAL MEDICINE

## 2024-10-25 NOTE — TELEPHONE ENCOUNTER
Pt notified of echo results.  He denies complaints or concerns.  We confirmed the April 2025 cardio follow up.

## 2024-10-25 NOTE — TELEPHONE ENCOUNTER
Interpretation Summary      Left Ventricle: Normal left ventricular systolic function. EF by 2D Simpsons Biplane is 66%. Left ventricle size is normal. Normal wall thickness. Normal wall motion. Grade I diastolic dysfunction with normal LAP.    Left Atrium: Left atrium is mildly dilated. LA Vol Index is  39 ml/m2.    Image quality is adequate.     Echo Findings    Left Ventricle Normal left ventricular systolic function. EF by 2D Simpsons Biplane is 66%. Left ventricle size is normal. Normal wall thickness. Normal wall motion. Grade I diastolic dysfunction with normal LAP.   Left Atrium Left atrium is mildly dilated. LA Vol Index is  39 ml/m2.   Right Ventricle Right ventricle size is normal. Normal systolic function.   Right Atrium Right atrium size is normal.   Aortic Valve Valve structure is normal. No regurgitation. No stenosis.   Mitral Valve Valve structure is normal. No regurgitation. No stenosis noted.   Tricuspid Valve Valve structure is normal. Trace regurgitation. No stenosis noted. Normal RVSP.   Pulmonic Valve The pulmonic valve visualization is suboptimal but appears to be functioning normally. Physiologically normal regurgitation. No stenosis noted.   Aorta Normal sized aortic root and ascending aorta.   IVC/Hepatic Veins IVC diameter is less than or equal to 21 mm and decreases greater than 50% during inspiration; therefore the estimated right atrial pressure is normal (~3 mmHg). IVC size is normal.   Pericardium No pericardial effusion.

## 2024-10-26 LAB
ECHO BSA: 2.12 M2
VAS AORTA MID PSV: 103.6 CM/S
VAS AORTA PROX PSV: 103.6 CM/S
VAS LEFT ARCUATE RENAL ARTERY EDV: 5.8 CM/S
VAS LEFT ARCUATE RENAL ARTERY EDV: 6.2 CM/S
VAS LEFT ARCUATE RENAL ARTERY EDV: 6.7 CM/S
VAS LEFT ARCUATE RENAL ARTERY PSV: 13.3 CM/S
VAS LEFT ARCUATE RENAL ARTERY PSV: 14.7 CM/S
VAS LEFT ARCUATE RENAL ARTERY PSV: 17.1 CM/S
VAS LEFT INFERIOR ARCUATE RI: 0.56
VAS LEFT KIDNEY ARCUATE ARTERY INFERIOR EDV: 5.8 CM/S
VAS LEFT KIDNEY ARCUATE ARTERY INFERIOR PSV: 13.3 CM/S
VAS LEFT KIDNEY ARCUATE ARTERY MEDIAL EDV: 6.7 CM/S
VAS LEFT KIDNEY ARCUATE ARTERY MEDIAL PSV: 14.7 CM/S
VAS LEFT KIDNEY ARCUATE ARTERY SUPERIOR EDV: 6.2 CM/S
VAS LEFT KIDNEY ARCUATE ARTERY SUPERIOR PSV: 17.1 CM/S
VAS LEFT KIDNEY LENGTH: 13.94 CM
VAS LEFT KIDNEY WIDTH: 5.4 CM
VAS LEFT MEDIAL ARCUATE RI: 0.54
VAS LEFT RENAL DIST EDV: 19.1 CM/S
VAS LEFT RENAL DIST PSV: 61.1 CM/S
VAS LEFT RENAL DIST RAR: 0.59
VAS LEFT RENAL DIST RI: 0.69
VAS LEFT RENAL MID EDV: 12.1 CM/S
VAS LEFT RENAL MID PSV: 30.3 CM/S
VAS LEFT RENAL MID RAR: 0.29
VAS LEFT RENAL MID RI: 0.6
VAS LEFT RENAL PROX EDV: 13.9 CM/S
VAS LEFT RENAL PROX PSV: 44.9 CM/S
VAS LEFT RENAL PROX RAR: 0.43
VAS LEFT RENAL PROX RI: 0.69
VAS LEFT RENAL RAR: 0.59
VAS LEFT SUPERIOR ARCUATE RI: 0.64
VAS RIGHT ARCUATE RENAL ARTERY EDV: 6.2 CM/S
VAS RIGHT ARCUATE RENAL ARTERY EDV: 6.5 CM/S
VAS RIGHT ARCUATE RENAL ARTERY EDV: 7.2 CM/S
VAS RIGHT ARCUATE RENAL ARTERY PSV: 15.4 CM/S
VAS RIGHT ARCUATE RENAL ARTERY PSV: 16.8 CM/S
VAS RIGHT ARCUATE RENAL ARTERY PSV: 20.4 CM/S
VAS RIGHT INFERIOR ARCUATE RI: 0.65
VAS RIGHT KIDNEY ARCUATE ARTERY INFERIOR EDV: 7.2 CM/S
VAS RIGHT KIDNEY ARCUATE ARTERY INFERIOR PSV: 20.4 CM/S
VAS RIGHT KIDNEY ARCUATE ARTERY MEDIAL EDV: 6.5 CM/S
VAS RIGHT KIDNEY ARCUATE ARTERY MEDIAL PSV: 16.8 CM/S
VAS RIGHT KIDNEY ARCUATE ARTERY SUPERIOR EDV: 6.2 CM/S
VAS RIGHT KIDNEY ARCUATE ARTERY SUPERIOR PSV: 15.4 CM/S
VAS RIGHT KIDNEY LENGTH: 13.97 CM
VAS RIGHT KIDNEY WIDTH: 6.82 CM
VAS RIGHT MEDIAL ARCUATE RI: 0.61
VAS RIGHT RENAL DIST EDV: 17.8 CM/S
VAS RIGHT RENAL DIST PSV: 60.6 CM/S
VAS RIGHT RENAL DIST RAR: 0.58
VAS RIGHT RENAL DIST RI: 0.71
VAS RIGHT RENAL MID EDV: 15.6 CM/S
VAS RIGHT RENAL MID PSV: 44.1 CM/S
VAS RIGHT RENAL MID RAR: 0.43
VAS RIGHT RENAL MID RI: 0.65
VAS RIGHT RENAL PROX EDV: 13.6 CM/S
VAS RIGHT RENAL PROX PSV: 34.5 CM/S
VAS RIGHT RENAL PROX RAR: 0.33
VAS RIGHT RENAL PROX RI: 0.61
VAS RIGHT RENAL RAR: 0.58
VAS RIGHT SUPERIOR ARCUATE RI: 0.6

## 2024-10-26 PROCEDURE — 93975 VASCULAR STUDY: CPT | Performed by: SURGERY

## 2025-03-10 RX ORDER — AMLODIPINE BESYLATE 5 MG/1
5 TABLET ORAL EVERY MORNING
Qty: 90 TABLET | Refills: 3 | Status: SHIPPED | OUTPATIENT
Start: 2025-03-10

## 2025-03-10 NOTE — TELEPHONE ENCOUNTER
Called [facility] to give report, they will call back when available   Last Appt:  10/14/2024  Next Appt:   4/14/2025  Med verified in Epic

## 2025-04-05 ENCOUNTER — HOSPITAL ENCOUNTER (OUTPATIENT)
Age: 60
Discharge: HOME | End: 2025-04-05
Payer: COMMERCIAL

## 2025-04-05 DIAGNOSIS — N18.31 STAGE 3A CHRONIC KIDNEY DISEASE (HCC): ICD-10-CM

## 2025-04-05 DIAGNOSIS — N14.2 CHRONIC DRUG-INDUCED TUBULOINTERSTITIAL NEPHRITIS: ICD-10-CM

## 2025-04-05 DIAGNOSIS — I10 ESSENTIAL HYPERTENSION: ICD-10-CM

## 2025-04-05 DIAGNOSIS — R73.01 IMPAIRED FASTING BLOOD SUGAR: ICD-10-CM

## 2025-04-05 DIAGNOSIS — I10 PRIMARY HYPERTENSION: ICD-10-CM

## 2025-04-05 DIAGNOSIS — Z12.5 SCREENING PSA (PROSTATE SPECIFIC ANTIGEN): ICD-10-CM

## 2025-04-05 LAB
25(OH)D3 SERPL-MCNC: 57.1 NG/ML (ref 30–100)
ALBUMIN SERPL-MCNC: 4.5 G/DL (ref 3.5–5.2)
ALBUMIN/GLOB SERPL: 2.3 {RATIO} (ref 1–2.5)
ALP SERPL-CCNC: 53 U/L (ref 40–129)
ALT SERPL-CCNC: 37 U/L (ref 10–50)
ANION GAP SERPL CALCULATED.3IONS-SCNC: 10 MMOL/L (ref 9–16)
ANION GAP SERPL CALCULATED.3IONS-SCNC: 9 MMOL/L (ref 9–16)
AST SERPL-CCNC: 27 U/L (ref 10–50)
BASOPHILS # BLD: 0.05 K/UL (ref 0–0.2)
BASOPHILS # BLD: 0.05 K/UL (ref 0–0.2)
BASOPHILS NFR BLD: 1 % (ref 0–2)
BASOPHILS NFR BLD: 1 % (ref 0–2)
BILIRUB SERPL-MCNC: 0.3 MG/DL (ref 0–1.2)
BILIRUB UR QL STRIP: NEGATIVE
BUN SERPL-MCNC: 24 MG/DL (ref 6–20)
BUN SERPL-MCNC: 24 MG/DL (ref 6–20)
BUN/CREAT SERPL: 15 (ref 9–20)
BUN/CREAT SERPL: 15 (ref 9–20)
CALCIUM SERPL-MCNC: 10.8 MG/DL (ref 8.6–10.4)
CALCIUM SERPL-MCNC: 10.9 MG/DL (ref 8.6–10.4)
CHLORIDE SERPL-SCNC: 109 MMOL/L (ref 98–107)
CHLORIDE SERPL-SCNC: 109 MMOL/L (ref 98–107)
CHOLEST SERPL-MCNC: 157 MG/DL (ref 0–199)
CHOLESTEROL/HDL RATIO: 2.9
CLARITY UR: CLEAR
CO2 SERPL-SCNC: 24 MMOL/L (ref 20–31)
CO2 SERPL-SCNC: 25 MMOL/L (ref 20–31)
COLOR UR: YELLOW
COMMENT: ABNORMAL
CREAT SERPL-MCNC: 1.6 MG/DL (ref 0.7–1.2)
CREAT SERPL-MCNC: 1.6 MG/DL (ref 0.7–1.2)
CREAT UR-MCNC: 20.9 MG/DL (ref 39–259)
EOSINOPHIL # BLD: 0.17 K/UL (ref 0–0.44)
EOSINOPHIL # BLD: 0.2 K/UL (ref 0–0.44)
EOSINOPHILS RELATIVE PERCENT: 4 % (ref 1–4)
EOSINOPHILS RELATIVE PERCENT: 4 % (ref 1–4)
ERYTHROCYTE [DISTWIDTH] IN BLOOD BY AUTOMATED COUNT: 13.8 % (ref 11.8–14.4)
ERYTHROCYTE [DISTWIDTH] IN BLOOD BY AUTOMATED COUNT: 13.8 % (ref 11.8–14.4)
EST. AVERAGE GLUCOSE BLD GHB EST-MCNC: 85 MG/DL
FOLATE SERPL-MCNC: 19.2 NG/ML (ref 4.8–24.2)
GFR, ESTIMATED: 50 ML/MIN/1.73M2
GFR, ESTIMATED: 50 ML/MIN/1.73M2
GLUCOSE SERPL-MCNC: 98 MG/DL (ref 74–99)
GLUCOSE SERPL-MCNC: 98 MG/DL (ref 74–99)
GLUCOSE UR STRIP-MCNC: NEGATIVE MG/DL
HBA1C MFR BLD: 4.6 % (ref 4–6)
HCT VFR BLD AUTO: 36.5 % (ref 40.7–50.3)
HCT VFR BLD AUTO: 36.8 % (ref 40.7–50.3)
HDLC SERPL-MCNC: 55 MG/DL
HGB BLD-MCNC: 12.3 G/DL (ref 13–17)
HGB BLD-MCNC: 12.3 G/DL (ref 13–17)
HGB UR QL STRIP.AUTO: NEGATIVE
IMM GRANULOCYTES # BLD AUTO: <0.03 K/UL (ref 0–0.3)
IMM GRANULOCYTES # BLD AUTO: <0.03 K/UL (ref 0–0.3)
IMM GRANULOCYTES NFR BLD: 0 %
IMM GRANULOCYTES NFR BLD: 0 %
KETONES UR STRIP-MCNC: NEGATIVE MG/DL
LDLC SERPL CALC-MCNC: 90 MG/DL (ref 0–100)
LEUKOCYTE ESTERASE UR QL STRIP: NEGATIVE
LITHIUM DATE LAST DOSE: NORMAL
LITHIUM DOSE AMOUNT: NORMAL
LITHIUM DOSE TIME: NORMAL
LITHIUM LEVEL: 0.7 MMOL/L (ref 0.6–1.2)
LYMPHOCYTES NFR BLD: 1.15 K/UL (ref 1.1–3.7)
LYMPHOCYTES NFR BLD: 1.23 K/UL (ref 1.1–3.7)
LYMPHOCYTES RELATIVE PERCENT: 26 % (ref 24–43)
LYMPHOCYTES RELATIVE PERCENT: 27 % (ref 24–43)
MCH RBC QN AUTO: 30.3 PG (ref 25.2–33.5)
MCH RBC QN AUTO: 30.6 PG (ref 25.2–33.5)
MCHC RBC AUTO-ENTMCNC: 33.4 G/DL (ref 25.2–33.5)
MCHC RBC AUTO-ENTMCNC: 33.7 G/DL (ref 25.2–33.5)
MCV RBC AUTO: 90.6 FL (ref 82.6–102.9)
MCV RBC AUTO: 90.8 FL (ref 82.6–102.9)
MONOCYTES NFR BLD: 0.53 K/UL (ref 0.1–1.2)
MONOCYTES NFR BLD: 0.59 K/UL (ref 0.1–1.2)
MONOCYTES NFR BLD: 12 % (ref 3–12)
MONOCYTES NFR BLD: 13 % (ref 3–12)
NEUTROPHILS NFR BLD: 56 % (ref 36–65)
NEUTROPHILS NFR BLD: 56 % (ref 36–65)
NEUTS SEG NFR BLD: 2.53 K/UL (ref 1.5–8.1)
NEUTS SEG NFR BLD: 2.59 K/UL (ref 1.5–8.1)
NITRITE UR QL STRIP: NEGATIVE
NRBC BLD-RTO: 0 PER 100 WBC
NRBC BLD-RTO: 0 PER 100 WBC
PH UR STRIP: 6 [PH] (ref 5–6)
PLATELET # BLD AUTO: 240 K/UL (ref 138–453)
PLATELET # BLD AUTO: 246 K/UL (ref 138–453)
PMV BLD AUTO: 10.1 FL (ref 8.1–13.5)
PMV BLD AUTO: 9.9 FL (ref 8.1–13.5)
POTASSIUM SERPL-SCNC: 4 MMOL/L (ref 3.7–5.3)
POTASSIUM SERPL-SCNC: 4.1 MMOL/L (ref 3.7–5.3)
PROT SERPL-MCNC: 6.5 G/DL (ref 6.6–8.7)
PROT UR STRIP-MCNC: NEGATIVE MG/DL
PSA SERPL-MCNC: 2.31 NG/ML (ref 0–4)
RBC # BLD AUTO: 4.02 M/UL (ref 4.21–5.77)
RBC # BLD AUTO: 4.06 M/UL (ref 4.21–5.77)
SODIUM SERPL-SCNC: 143 MMOL/L (ref 136–145)
SODIUM SERPL-SCNC: 143 MMOL/L (ref 136–145)
SP GR UR STRIP: 1 (ref 1.01–1.02)
TOTAL PROTEIN, URINE: <4 MG/DL
TRIGL SERPL-MCNC: 59 MG/DL
TSH SERPL DL<=0.05 MIU/L-ACNC: 1.47 UIU/ML (ref 0.27–4.2)
UROBILINOGEN UR STRIP-ACNC: NORMAL EU/DL (ref 0–1)
VIT B12 SERPL-MCNC: 470 PG/ML (ref 232–1245)
VLDLC SERPL CALC-MCNC: 12 MG/DL (ref 1–30)
WBC OTHER # BLD: 4.5 K/UL (ref 3.5–11.3)
WBC OTHER # BLD: 4.6 K/UL (ref 3.5–11.3)

## 2025-04-05 PROCEDURE — 82607 VITAMIN B-12: CPT

## 2025-04-05 PROCEDURE — 80061 LIPID PANEL: CPT

## 2025-04-05 PROCEDURE — 80178 ASSAY OF LITHIUM: CPT

## 2025-04-05 PROCEDURE — 81003 URINALYSIS AUTO W/O SCOPE: CPT

## 2025-04-05 PROCEDURE — 84156 ASSAY OF PROTEIN URINE: CPT

## 2025-04-05 PROCEDURE — 82570 ASSAY OF URINE CREATININE: CPT

## 2025-04-05 PROCEDURE — 83036 HEMOGLOBIN GLYCOSYLATED A1C: CPT

## 2025-04-05 PROCEDURE — 82306 VITAMIN D 25 HYDROXY: CPT

## 2025-04-05 PROCEDURE — 85025 COMPLETE CBC W/AUTO DIFF WBC: CPT

## 2025-04-05 PROCEDURE — 82746 ASSAY OF FOLIC ACID SERUM: CPT

## 2025-04-05 PROCEDURE — 84443 ASSAY THYROID STIM HORMONE: CPT

## 2025-04-05 PROCEDURE — 80053 COMPREHEN METABOLIC PANEL: CPT

## 2025-04-05 PROCEDURE — G0103 PSA SCREENING: HCPCS

## 2025-04-05 PROCEDURE — 80048 BASIC METABOLIC PNL TOTAL CA: CPT

## 2025-04-05 PROCEDURE — 36415 COLL VENOUS BLD VENIPUNCTURE: CPT

## 2025-04-09 ENCOUNTER — RESULTS FOLLOW-UP (OUTPATIENT)
Dept: FAMILY MEDICINE CLINIC | Age: 60
End: 2025-04-09

## 2025-04-14 ENCOUNTER — OFFICE VISIT (OUTPATIENT)
Dept: CARDIOLOGY | Age: 60
End: 2025-04-14
Payer: COMMERCIAL

## 2025-04-14 VITALS
BODY MASS INDEX: 29.2 KG/M2 | HEART RATE: 59 BPM | WEIGHT: 204 LBS | SYSTOLIC BLOOD PRESSURE: 130 MMHG | DIASTOLIC BLOOD PRESSURE: 86 MMHG | HEIGHT: 70 IN

## 2025-04-14 DIAGNOSIS — R06.02 SHORTNESS OF BREATH: ICD-10-CM

## 2025-04-14 DIAGNOSIS — R00.2 PALPITATIONS: Primary | ICD-10-CM

## 2025-04-14 DIAGNOSIS — I10 ESSENTIAL HYPERTENSION: ICD-10-CM

## 2025-04-14 PROCEDURE — 3075F SYST BP GE 130 - 139MM HG: CPT | Performed by: SURGERY

## 2025-04-14 PROCEDURE — 93000 ELECTROCARDIOGRAM COMPLETE: CPT | Performed by: SURGERY

## 2025-04-14 PROCEDURE — 99214 OFFICE O/P EST MOD 30 MIN: CPT | Performed by: SURGERY

## 2025-04-14 PROCEDURE — 3079F DIAST BP 80-89 MM HG: CPT | Performed by: SURGERY

## 2025-04-14 RX ORDER — LITHIUM CARBONATE 300 MG/1
300 TABLET, FILM COATED, EXTENDED RELEASE ORAL NIGHTLY
COMMUNITY

## 2025-04-14 NOTE — PROGRESS NOTES
Cardiology Consultation/Follow Up.  [facility]    Patrice Lund  1965  [id number]    Today: 4/14/25    CC: Patient is here for hypertension follow-up    HPI:   Patrice Lund is here to establish care.   Seen and examined patient is non-smoker no chest pain however does have dyspnea with activity and climbing steps no swelling to lower extremity blood pressure has been on the higher side at home stable in office    Past Medical:  Past Medical History:   Diagnosis Date    Bipolar disorder     diagnosed 1986    Hypertension     DENISE on CPAP 2012         Past Surgical:  Past Surgical History:   Procedure Laterality Date    COLONOSCOPY  06/16/2017    normal, Dr Walsh at [facility]-10 yr follow up    CYST REMOVAL N/A 03/31/2017    scalp, done by dr. Clark    LYMPH NODE DISSECTION Right 2015    [facility]    TONSILLECTOMY      VASECTOMY  12/15/2000         Family History:  Family History   Problem Relation Age of Onset    Uterine Cancer Paternal Grandmother     Lung Cancer Paternal Grandmother     High Cholesterol Paternal Grandmother     Brain Cancer Paternal Grandfather     Breast Cancer Mother     Mental Illness Mother         Anxiety    Atrial Fibrillation Father     Diabetes Father     High Blood Pressure Father     Kidney Disease Father     Obesity Father        Social History:  Social History     Socioeconomic History    Marital status:      Spouse name: Mila    Number of children: 3    Years of education: 14    Highest education level: Not on file   Occupational History    Not on file   Tobacco Use    Smoking status: Never     Passive exposure: Never    Smokeless tobacco: Never   Vaping Use    Vaping status: Never Used   Substance and Sexual Activity    Alcohol use: Yes     Alcohol/week: 2.0 standard drinks of alcohol     Types: 2 Cans of beer per week     Comment: minimal    Drug use: No    Sexual activity: Yes     Partners: Female   Other Topics Concern    Not on file   Social History

## 2025-04-15 ENCOUNTER — OFFICE VISIT (OUTPATIENT)
Dept: NEPHROLOGY | Age: 60
End: 2025-04-15
Payer: COMMERCIAL

## 2025-04-15 VITALS
BODY MASS INDEX: 28.92 KG/M2 | WEIGHT: 202 LBS | HEIGHT: 70 IN | DIASTOLIC BLOOD PRESSURE: 80 MMHG | SYSTOLIC BLOOD PRESSURE: 128 MMHG | HEART RATE: 68 BPM

## 2025-04-15 DIAGNOSIS — I10 PRIMARY HYPERTENSION: ICD-10-CM

## 2025-04-15 DIAGNOSIS — N14.2 CHRONIC DRUG-INDUCED TUBULOINTERSTITIAL NEPHRITIS: ICD-10-CM

## 2025-04-15 DIAGNOSIS — N18.31 STAGE 3A CHRONIC KIDNEY DISEASE (HCC): Primary | ICD-10-CM

## 2025-04-15 PROCEDURE — 3074F SYST BP LT 130 MM HG: CPT

## 2025-04-15 PROCEDURE — 99213 OFFICE O/P EST LOW 20 MIN: CPT

## 2025-04-15 PROCEDURE — 3079F DIAST BP 80-89 MM HG: CPT

## 2025-04-15 NOTE — PROGRESS NOTES
Renal Consult Note    Patient :  Patrice Lund; 59 y.o. MRN# [id number]    Reason for Consult:     Asked by Dr Cheema att. providers found to see for ALEA/Elevated Creatinine.    History of Present Illness:     Patrice Lund; 59 y.o. male with past medical history as mentioned below.  Patient was back to see me in 4-month return visit on 10/23/2024    Medications include multivitamin, Inderal 10 mg oral twice daily, hydralazine 100 mg 3 times daily, amlodipine 5 mg oral daily, fish oil, Lamictal and lithium 75 mg at night.  He said he was admitted to the [facility] for a hypomanic episode earlier this year between our visits and his antihypertensive regimen was changed.  He denies any dizziness or lightheadedness or orthostatic symptoms.  No chest pain or significant shortness of breath.  He is a little bit tired and he blames that on not using his CPAP regularly.    At a previous visit earlier in 2024 it was noted, the patient had a psychotic episode of lithium and his significant other requested the patient be back on lithium.  I think that is reasonable based on the workup that has been done.  Last creatinine 1.5.  He typically has his levels on the low portion of the therapeutic index so he has low normal levels which tend to be helpful to lower the risk of renal toxicity over time.    Vital signs today showed blood pressure 114/78 with heart rate 67 bpm weight is 90.8 kg which is 200 pounds and BMI 28.7.    Labs from 10/10/2024 showed sodium 142, potassium 3.9, chloride 105 and CO2 29 with BUN 20 and creatinine 1.5.  Estimated GFR is 54 mL/min.  CBC showed white blood cell 6.4 with hemoglobin 12.9 and platelets 264.     Renal ultrasound from 4/2/2024 showed right kidney 10.6 cm and left kidney symmetrical at 10.5 cm with increased echogenicity consistent with chronic medical renal disease.  No hydronephrosis or nephrolithiasis and normal bladder appearance with no significant postvoid

## 2025-04-17 ENCOUNTER — OFFICE VISIT (OUTPATIENT)
Dept: FAMILY MEDICINE CLINIC | Age: 60
End: 2025-04-17

## 2025-04-17 VITALS
DIASTOLIC BLOOD PRESSURE: 80 MMHG | SYSTOLIC BLOOD PRESSURE: 122 MMHG | RESPIRATION RATE: 16 BRPM | WEIGHT: 201.6 LBS | BODY MASS INDEX: 28.86 KG/M2 | HEART RATE: 63 BPM | HEIGHT: 70 IN | OXYGEN SATURATION: 95 %

## 2025-04-17 DIAGNOSIS — Z12.5 SCREENING PSA (PROSTATE SPECIFIC ANTIGEN): ICD-10-CM

## 2025-04-17 DIAGNOSIS — N18.31 STAGE 3A CHRONIC KIDNEY DISEASE (HCC): ICD-10-CM

## 2025-04-17 DIAGNOSIS — F31.78 BIPOLAR DISORDER, IN FULL REMISSION, MOST RECENT EPISODE MIXED: ICD-10-CM

## 2025-04-17 DIAGNOSIS — R73.01 IMPAIRED FASTING BLOOD SUGAR: ICD-10-CM

## 2025-04-17 DIAGNOSIS — I10 ESSENTIAL HYPERTENSION: ICD-10-CM

## 2025-04-17 DIAGNOSIS — Z23 NEED FOR VACCINATION: Primary | ICD-10-CM

## 2025-04-17 DIAGNOSIS — M25.562 CHRONIC PAIN OF LEFT KNEE: ICD-10-CM

## 2025-04-17 DIAGNOSIS — G47.33 OSA ON CPAP: ICD-10-CM

## 2025-04-17 DIAGNOSIS — G89.29 CHRONIC PAIN OF LEFT KNEE: ICD-10-CM

## 2025-04-17 SDOH — ECONOMIC STABILITY: FOOD INSECURITY: WITHIN THE PAST 12 MONTHS, YOU WORRIED THAT YOUR FOOD WOULD RUN OUT BEFORE YOU GOT MONEY TO BUY MORE.: NEVER TRUE

## 2025-04-17 SDOH — ECONOMIC STABILITY: FOOD INSECURITY: WITHIN THE PAST 12 MONTHS, THE FOOD YOU BOUGHT JUST DIDN'T LAST AND YOU DIDN'T HAVE MONEY TO GET MORE.: NEVER TRUE

## 2025-04-17 ASSESSMENT — ENCOUNTER SYMPTOMS
BACK PAIN: 1
RESPIRATORY NEGATIVE: 1
ALLERGIC/IMMUNOLOGIC NEGATIVE: 1
GASTROINTESTINAL NEGATIVE: 1
EYES NEGATIVE: 1

## 2025-04-17 ASSESSMENT — PATIENT HEALTH QUESTIONNAIRE - PHQ9
7. TROUBLE CONCENTRATING ON THINGS, SUCH AS READING THE NEWSPAPER OR WATCHING TELEVISION: NOT AT ALL
10. IF YOU CHECKED OFF ANY PROBLEMS, HOW DIFFICULT HAVE THESE PROBLEMS MADE IT FOR YOU TO DO YOUR WORK, TAKE CARE OF THINGS AT HOME, OR GET ALONG WITH OTHER PEOPLE: NOT DIFFICULT AT ALL
4. FEELING TIRED OR HAVING LITTLE ENERGY: NOT AT ALL
1. LITTLE INTEREST OR PLEASURE IN DOING THINGS: NOT AT ALL
SUM OF ALL RESPONSES TO PHQ QUESTIONS 1-9: 0
6. FEELING BAD ABOUT YOURSELF - OR THAT YOU ARE A FAILURE OR HAVE LET YOURSELF OR YOUR FAMILY DOWN: NOT AT ALL
3. TROUBLE FALLING OR STAYING ASLEEP: NOT AT ALL
9. THOUGHTS THAT YOU WOULD BE BETTER OFF DEAD, OR OF HURTING YOURSELF: NOT AT ALL
SUM OF ALL RESPONSES TO PHQ QUESTIONS 1-9: 0
SUM OF ALL RESPONSES TO PHQ QUESTIONS 1-9: 0
2. FEELING DOWN, DEPRESSED OR HOPELESS: NOT AT ALL
5. POOR APPETITE OR OVEREATING: NOT AT ALL
8. MOVING OR SPEAKING SO SLOWLY THAT OTHER PEOPLE COULD HAVE NOTICED. OR THE OPPOSITE, BEING SO FIGETY OR RESTLESS THAT YOU HAVE BEEN MOVING AROUND A LOT MORE THAN USUAL: NOT AT ALL
SUM OF ALL RESPONSES TO PHQ QUESTIONS 1-9: 0

## 2025-04-17 NOTE — PROGRESS NOTES
interval.  About the same.      Mild anemia at present.  No blood loss or donation recalled.  Serial follow up.

## 2025-04-30 ENCOUNTER — HOSPITAL ENCOUNTER (OUTPATIENT)
Age: 60
Discharge: HOME | End: 2025-05-02
Payer: COMMERCIAL

## 2025-04-30 ENCOUNTER — HOSPITAL ENCOUNTER (OUTPATIENT)
Dept: NUCLEAR MEDICINE | Age: 60
Discharge: HOME | End: 2025-05-02
Payer: COMMERCIAL

## 2025-04-30 DIAGNOSIS — R06.02 SHORTNESS OF BREATH: ICD-10-CM

## 2025-04-30 LAB
NUC STRESS EJECTION FRACTION: 58 %
STRESS BASELINE DIAS BP: 106 MMHG
STRESS BASELINE HR: 52 BPM
STRESS BASELINE SYS BP: 150 MMHG
STRESS ESTIMATED WORKLOAD: 1 METS
STRESS PEAK DIAS BP: 106 MMHG
STRESS PEAK SYS BP: 150 MMHG
STRESS PERCENT HR ACHIEVED: 48 %
STRESS POST PEAK HR: 77 BPM
STRESS RATE PRESSURE PRODUCT: NORMAL BPM*MMHG
STRESS TARGET HR: 162 BPM
TID: 1.05

## 2025-04-30 PROCEDURE — 3430000000 HC RX DIAGNOSTIC RADIOPHARMACEUTICAL: Performed by: SURGERY

## 2025-04-30 PROCEDURE — 93017 CV STRESS TEST TRACING ONLY: CPT

## 2025-04-30 PROCEDURE — 78452 HT MUSCLE IMAGE SPECT MULT: CPT

## 2025-04-30 PROCEDURE — 6360000002 HC RX W HCPCS: Performed by: INTERNAL MEDICINE

## 2025-04-30 PROCEDURE — A9500 TC99M SESTAMIBI: HCPCS | Performed by: SURGERY

## 2025-04-30 RX ORDER — QUETIAPINE FUMARATE 100 MG/1
100 TABLET, FILM COATED ORAL ONCE
COMMUNITY
Start: 2025-04-25

## 2025-04-30 RX ORDER — REGADENOSON 0.08 MG/ML
0.4 INJECTION, SOLUTION INTRAVENOUS ONCE
Status: COMPLETED | OUTPATIENT
Start: 2025-04-30 | End: 2025-04-30

## 2025-04-30 RX ORDER — TETRAKIS(2-METHOXYISOBUTYLISOCYANIDE)COPPER(I) TETRAFLUOROBORATE 1 MG/ML
10 INJECTION, POWDER, LYOPHILIZED, FOR SOLUTION INTRAVENOUS
Status: COMPLETED | OUTPATIENT
Start: 2025-04-30 | End: 2025-04-30

## 2025-04-30 RX ORDER — TETRAKIS(2-METHOXYISOBUTYLISOCYANIDE)COPPER(I) TETRAFLUOROBORATE 1 MG/ML
30 INJECTION, POWDER, LYOPHILIZED, FOR SOLUTION INTRAVENOUS
Status: COMPLETED | OUTPATIENT
Start: 2025-04-30 | End: 2025-04-30

## 2025-04-30 RX ORDER — AMLODIPINE BESYLATE 5 MG/1
5 TABLET ORAL DAILY
Qty: 90 TABLET | Refills: 3 | Status: SHIPPED | OUTPATIENT
Start: 2025-04-30

## 2025-04-30 RX ADMIN — REGADENOSON 0.4 MG: 0.08 INJECTION, SOLUTION INTRAVENOUS at 13:55

## 2025-04-30 RX ADMIN — Medication 30 MILLICURIE: at 13:55

## 2025-04-30 RX ADMIN — Medication 10 MILLICURIE: at 13:00

## 2025-05-01 ENCOUNTER — TELEPHONE (OUTPATIENT)
Dept: CARDIOLOGY | Age: 60
End: 2025-05-01

## 2025-05-01 NOTE — TELEPHONE ENCOUNTER
Interpretation Summary  Show Result Comparison     Stress Combined Conclusion: The study is negative for myocardial ischemia and infarction. Findings suggest a low risk of cardiac events.    Stress Function: Left ventricular function post-stress is normal. Post-stress ejection fraction is 58%.    Perfusion Comments: Prone images were obtained. LV perfusion is normal. There is no evidence of inducible ischemia.    Perfusion Conclusion: TID ratio is 1.05.    ECG: The stress ECG was negative for ischemia.    ECG: Resting ECG demonstrates normal sinus rhythm.    Stress Test: A pharmacological stress test was performed using regadenoson (Lexiscan).

## 2025-05-17 ENCOUNTER — HOSPITAL ENCOUNTER (OUTPATIENT)
Age: 60
Discharge: HOME | End: 2025-05-17
Payer: COMMERCIAL

## 2025-05-17 LAB
LITHIUM DATE LAST DOSE: NORMAL
LITHIUM DOSE TIME: 2230
LITHIUM LEVEL: 0.8 MMOL/L (ref 0.6–1.2)

## 2025-05-17 PROCEDURE — 80178 ASSAY OF LITHIUM: CPT

## 2025-05-17 PROCEDURE — 36415 COLL VENOUS BLD VENIPUNCTURE: CPT

## 2025-05-19 DIAGNOSIS — M25.561 RIGHT KNEE PAIN, UNSPECIFIED CHRONICITY: Primary | ICD-10-CM

## 2025-05-27 ENCOUNTER — HOSPITAL ENCOUNTER (OUTPATIENT)
Dept: GENERAL RADIOLOGY | Age: 60
Discharge: HOME | End: 2025-05-29
Payer: COMMERCIAL

## 2025-05-27 ENCOUNTER — OFFICE VISIT (OUTPATIENT)
Dept: ORTHOPEDIC SURGERY | Age: 60
End: 2025-05-27

## 2025-05-27 VITALS
SYSTOLIC BLOOD PRESSURE: 124 MMHG | HEART RATE: 79 BPM | BODY MASS INDEX: 28.77 KG/M2 | DIASTOLIC BLOOD PRESSURE: 68 MMHG | WEIGHT: 201 LBS | HEIGHT: 70 IN

## 2025-05-27 DIAGNOSIS — M25.561 RIGHT KNEE PAIN, UNSPECIFIED CHRONICITY: ICD-10-CM

## 2025-05-27 DIAGNOSIS — M25.561 RIGHT KNEE PAIN, UNSPECIFIED CHRONICITY: Primary | ICD-10-CM

## 2025-05-27 PROCEDURE — 73562 X-RAY EXAM OF KNEE 3: CPT

## 2025-05-27 RX ORDER — TRIAMCINOLONE ACETONIDE 40 MG/ML
80 INJECTION, SUSPENSION INTRA-ARTICULAR; INTRAMUSCULAR ONCE
Status: COMPLETED | OUTPATIENT
Start: 2025-05-27 | End: 2025-05-27

## 2025-05-27 RX ORDER — BUPIVACAINE HYDROCHLORIDE 5 MG/ML
5 INJECTION, SOLUTION PERINEURAL ONCE
Status: COMPLETED | OUTPATIENT
Start: 2025-05-27 | End: 2025-05-27

## 2025-05-27 RX ADMIN — BUPIVACAINE HYDROCHLORIDE 25 MG: 5 INJECTION, SOLUTION PERINEURAL at 08:28

## 2025-05-27 RX ADMIN — TRIAMCINOLONE ACETONIDE 80 MG: 40 INJECTION, SUSPENSION INTRA-ARTICULAR; INTRAMUSCULAR at 08:28

## 2025-05-27 NOTE — PROGRESS NOTES
Orthopaedic Progress Note      CHIEF COMPLAINT: Right knee pain    HISTORY OF PRESENT ILLNESS:       Mr. Lund  is a 59 y.o. male  who presents with a long history of right knee pain.  He localizes pain to the medial aspect of his knee.  He denies any specific injury or trauma.  He denies ever having a surgery on his knee.  He states pain is worse with activity relieved with rest.  He is here today for initial orthopedic evaluation.      Past Medical History:    Past Medical History:   Diagnosis Date    Bipolar disorder (HCC)     diagnosed 1986    Hypertension     DENISE on CPAP 2012       Past Surgical History:    Past Surgical History:   Procedure Laterality Date    COLONOSCOPY  06/16/2017    normal, Dr Walsh at [facility]-10 yr follow up    CYST REMOVAL N/A 03/31/2017    scalp, done by dr. Clark    LYMPH NODE DISSECTION Right 2015    [facility]    TONSILLECTOMY      VASECTOMY  12/15/2000         Current  Medications:  Current Outpatient Medications   Medication Sig Dispense Refill    amLODIPine (NORVASC) 5 MG tablet Take 1 tablet by mouth daily 90 tablet 3    QUEtiapine (SEROQUEL) 100 MG tablet Take 1 tablet by mouth once      lithium (LITHOBID) 300 MG extended release tablet Take 1 tablet by mouth at bedtime With 450 to total 750 mg nightly      hydrALAZINE (APRESOLINE) 100 MG tablet Take 1 tablet by mouth 3 times daily 270 tablet 3    propranolol (INDERAL) 10 MG tablet Take 1 tablet by mouth 2 times daily 180 tablet 3    lithium (ESKALITH) 450 MG extended release tablet Take 1 tablet by mouth at bedtime With 300 mg to total 750 mg nightly      lamoTRIgine (LAMICTAL) 100 MG tablet Take 3 tablets by mouth daily      Multiple Vitamins-Minerals (MULTIVITAMIN PO) Take 1 tablet by mouth daily      Omega-3 Fatty Acids (FISH OIL PO) Take 2 capsules by mouth daily       No current facility-administered medications for this visit.       Allergies:  Patient has no known allergies.    Social History:   Social History

## 2025-06-24 ENCOUNTER — OFFICE VISIT (OUTPATIENT)
Dept: PRIMARY CARE CLINIC | Age: 60
End: 2025-06-24

## 2025-06-24 VITALS
WEIGHT: 216.6 LBS | OXYGEN SATURATION: 96 % | BODY MASS INDEX: 31.08 KG/M2 | TEMPERATURE: 98.3 F | SYSTOLIC BLOOD PRESSURE: 140 MMHG | HEART RATE: 71 BPM | DIASTOLIC BLOOD PRESSURE: 100 MMHG

## 2025-06-24 DIAGNOSIS — L95.9 VASCULITIS LIMITED TO SKIN: Primary | ICD-10-CM

## 2025-06-24 RX ORDER — PREDNISONE 20 MG/1
TABLET ORAL
Qty: 10 TABLET | Refills: 0 | Status: SHIPPED | OUTPATIENT
Start: 2025-06-24

## 2025-06-24 RX ORDER — QUETIAPINE FUMARATE 50 MG/1
TABLET, FILM COATED ORAL
COMMUNITY
Start: 2025-06-02

## 2025-06-24 ASSESSMENT — ENCOUNTER SYMPTOMS
VOMITING: 0
COUGH: 0
DIARRHEA: 0
SHORTNESS OF BREATH: 0
ABDOMINAL PAIN: 0
COLOR CHANGE: 1
NAUSEA: 0
CONSTIPATION: 0
WHEEZING: 0

## 2025-06-24 NOTE — PROGRESS NOTES
2025     Patrice Lund (:  1965) is a 59 y.o. male, here for evaluation of the following medical concerns:    Rash  Pertinent negatives include no cough, diarrhea, fatigue, fever, shortness of breath or vomiting.       History of Present Illness  The patient is a 59-year-old male who presents for evaluation of a rash on bilateral legs.    He reports the onset of the rash 3 days ago, describing it as red, warm, and slightly more swollen than usual. He does not experience any pain associated with the rash but notes occasional warmth. His daily routine involves walking on concrete surfaces while wearing work boots, but he has not noticed an increase in foot activity. He also mentions exposure to heat at his workplace. He has been managing the condition by wearing compression socks and maintaining hydration through increased water intake. He also seeks out cooler environments when possible.     He reports no recent illness, fever, chills, or shortness of breath. He has chronic kidney disease (CKD) and experiences mild edema intermittently. The current swelling is slightly more pronounced than his usual baseline.    Did review patient's med list, allergies, social history,pmhx and pshx today as noted in the record.    Review of Systems   Constitutional:  Negative for chills, fatigue and fever.   HENT: Negative.     Respiratory:  Negative for cough, shortness of breath and wheezing.    Cardiovascular:  Positive for leg swelling. Negative for chest pain.   Gastrointestinal:  Negative for abdominal pain, constipation, diarrhea, nausea and vomiting.   Genitourinary:  Negative for dysuria, frequency and urgency.   Musculoskeletal:  Negative for arthralgias and gait problem.   Skin:  Positive for color change and rash.       Prior to Visit Medications    Medication Sig Taking? Authorizing Provider   predniSONE (DELTASONE) 20 MG tablet 1 bid for 5 days Yes Sammie Diez W, DO   amLODIPine (NORVASC) 5 MG

## 2025-06-28 ENCOUNTER — HOSPITAL ENCOUNTER (OUTPATIENT)
Age: 60
Discharge: HOME | End: 2025-06-28
Payer: COMMERCIAL

## 2025-06-28 LAB
LITHIUM DATE LAST DOSE: NORMAL
LITHIUM DOSE TIME: 2230
LITHIUM LEVEL: 0.7 MMOL/L (ref 0.6–1.2)

## 2025-06-28 PROCEDURE — 36415 COLL VENOUS BLD VENIPUNCTURE: CPT

## 2025-06-28 PROCEDURE — 80178 ASSAY OF LITHIUM: CPT
